# Patient Record
Sex: FEMALE | Race: WHITE | NOT HISPANIC OR LATINO | URBAN - METROPOLITAN AREA
[De-identification: names, ages, dates, MRNs, and addresses within clinical notes are randomized per-mention and may not be internally consistent; named-entity substitution may affect disease eponyms.]

---

## 2019-03-27 ENCOUNTER — OUTPATIENT (OUTPATIENT)
Dept: OUTPATIENT SERVICES | Facility: HOSPITAL | Age: 69
LOS: 1 days | Discharge: HOME | End: 2019-03-27

## 2019-03-27 VITALS — HEIGHT: 62 IN | WEIGHT: 119.49 LBS

## 2019-03-27 DIAGNOSIS — Z01.818 ENCOUNTER FOR OTHER PREPROCEDURAL EXAMINATION: ICD-10-CM

## 2019-03-27 DIAGNOSIS — G56.01 CARPAL TUNNEL SYNDROME, RIGHT UPPER LIMB: ICD-10-CM

## 2019-03-27 DIAGNOSIS — Z98.890 OTHER SPECIFIED POSTPROCEDURAL STATES: Chronic | ICD-10-CM

## 2019-03-27 LAB
ALBUMIN SERPL ELPH-MCNC: 4.5 G/DL — SIGNIFICANT CHANGE UP (ref 3.5–5.2)
ALP SERPL-CCNC: 76 U/L — SIGNIFICANT CHANGE UP (ref 30–115)
ALT FLD-CCNC: 31 U/L — SIGNIFICANT CHANGE UP (ref 0–41)
ANION GAP SERPL CALC-SCNC: 14 MMOL/L — SIGNIFICANT CHANGE UP (ref 7–14)
APTT BLD: 28.8 SEC — SIGNIFICANT CHANGE UP (ref 27–39.2)
AST SERPL-CCNC: 33 U/L — SIGNIFICANT CHANGE UP (ref 0–41)
BASOPHILS # BLD AUTO: 0.03 K/UL — SIGNIFICANT CHANGE UP (ref 0–0.2)
BASOPHILS NFR BLD AUTO: 0.4 % — SIGNIFICANT CHANGE UP (ref 0–1)
BILIRUB SERPL-MCNC: 0.3 MG/DL — SIGNIFICANT CHANGE UP (ref 0.2–1.2)
BUN SERPL-MCNC: 27 MG/DL — HIGH (ref 10–20)
CALCIUM SERPL-MCNC: 10.9 MG/DL — HIGH (ref 8.5–10.1)
CHLORIDE SERPL-SCNC: 98 MMOL/L — SIGNIFICANT CHANGE UP (ref 98–110)
CO2 SERPL-SCNC: 32 MMOL/L — SIGNIFICANT CHANGE UP (ref 17–32)
CREAT SERPL-MCNC: 0.7 MG/DL — SIGNIFICANT CHANGE UP (ref 0.7–1.5)
EOSINOPHIL # BLD AUTO: 0.18 K/UL — SIGNIFICANT CHANGE UP (ref 0–0.7)
EOSINOPHIL NFR BLD AUTO: 2.7 % — SIGNIFICANT CHANGE UP (ref 0–8)
GLUCOSE SERPL-MCNC: 85 MG/DL — SIGNIFICANT CHANGE UP (ref 70–99)
HCT VFR BLD CALC: 39.1 % — SIGNIFICANT CHANGE UP (ref 37–47)
HGB BLD-MCNC: 13 G/DL — SIGNIFICANT CHANGE UP (ref 12–16)
IMM GRANULOCYTES NFR BLD AUTO: 0.3 % — SIGNIFICANT CHANGE UP (ref 0.1–0.3)
INR BLD: 0.97 RATIO — SIGNIFICANT CHANGE UP (ref 0.65–1.3)
LYMPHOCYTES # BLD AUTO: 1.72 K/UL — SIGNIFICANT CHANGE UP (ref 1.2–3.4)
LYMPHOCYTES # BLD AUTO: 25.5 % — SIGNIFICANT CHANGE UP (ref 20.5–51.1)
MCHC RBC-ENTMCNC: 30.4 PG — SIGNIFICANT CHANGE UP (ref 27–31)
MCHC RBC-ENTMCNC: 33.2 G/DL — SIGNIFICANT CHANGE UP (ref 32–37)
MCV RBC AUTO: 91.6 FL — SIGNIFICANT CHANGE UP (ref 81–99)
MONOCYTES # BLD AUTO: 0.52 K/UL — SIGNIFICANT CHANGE UP (ref 0.1–0.6)
MONOCYTES NFR BLD AUTO: 7.7 % — SIGNIFICANT CHANGE UP (ref 1.7–9.3)
NEUTROPHILS # BLD AUTO: 4.27 K/UL — SIGNIFICANT CHANGE UP (ref 1.4–6.5)
NEUTROPHILS NFR BLD AUTO: 63.4 % — SIGNIFICANT CHANGE UP (ref 42.2–75.2)
NRBC # BLD: 0 /100 WBCS — SIGNIFICANT CHANGE UP (ref 0–0)
PLATELET # BLD AUTO: 238 K/UL — SIGNIFICANT CHANGE UP (ref 130–400)
POTASSIUM SERPL-MCNC: 4.5 MMOL/L — SIGNIFICANT CHANGE UP (ref 3.5–5)
POTASSIUM SERPL-SCNC: 4.5 MMOL/L — SIGNIFICANT CHANGE UP (ref 3.5–5)
PROT SERPL-MCNC: 7.1 G/DL — SIGNIFICANT CHANGE UP (ref 6–8)
PROTHROM AB SERPL-ACNC: 11.2 SEC — SIGNIFICANT CHANGE UP (ref 9.95–12.87)
RBC # BLD: 4.27 M/UL — SIGNIFICANT CHANGE UP (ref 4.2–5.4)
RBC # FLD: 13.1 % — SIGNIFICANT CHANGE UP (ref 11.5–14.5)
SODIUM SERPL-SCNC: 144 MMOL/L — SIGNIFICANT CHANGE UP (ref 135–146)
WBC # BLD: 6.74 K/UL — SIGNIFICANT CHANGE UP (ref 4.8–10.8)
WBC # FLD AUTO: 6.74 K/UL — SIGNIFICANT CHANGE UP (ref 4.8–10.8)

## 2019-03-27 NOTE — H&P PST ADULT - NSICDXPASTMEDICALHX_GEN_ALL_CORE_FT
PAST MEDICAL HISTORY:  Autonomous thyroid nodule     HTN (hypertension)     Lung nodule     OA (osteoarthritis)

## 2019-04-10 ENCOUNTER — OUTPATIENT (OUTPATIENT)
Dept: OUTPATIENT SERVICES | Facility: HOSPITAL | Age: 69
LOS: 1 days | Discharge: HOME | End: 2019-04-10

## 2019-04-10 VITALS
DIASTOLIC BLOOD PRESSURE: 69 MMHG | HEART RATE: 70 BPM | SYSTOLIC BLOOD PRESSURE: 154 MMHG | WEIGHT: 119.49 LBS | HEIGHT: 62 IN | OXYGEN SATURATION: 99 % | TEMPERATURE: 98 F | RESPIRATION RATE: 16 BRPM

## 2019-04-10 VITALS
HEART RATE: 56 BPM | RESPIRATION RATE: 18 BRPM | OXYGEN SATURATION: 96 % | SYSTOLIC BLOOD PRESSURE: 119 MMHG | DIASTOLIC BLOOD PRESSURE: 57 MMHG

## 2019-04-10 DIAGNOSIS — Z98.890 OTHER SPECIFIED POSTPROCEDURAL STATES: Chronic | ICD-10-CM

## 2019-04-10 DIAGNOSIS — I10 ESSENTIAL (PRIMARY) HYPERTENSION: ICD-10-CM

## 2019-04-10 RX ORDER — IBUPROFEN 200 MG
1 TABLET ORAL
Qty: 20 | Refills: 0
Start: 2019-04-10

## 2019-04-10 RX ORDER — HYDROMORPHONE HYDROCHLORIDE 2 MG/ML
0.5 INJECTION INTRAMUSCULAR; INTRAVENOUS; SUBCUTANEOUS ONCE
Qty: 0 | Refills: 0 | Status: DISCONTINUED | OUTPATIENT
Start: 2019-04-10 | End: 2019-04-10

## 2019-04-10 RX ORDER — SODIUM CHLORIDE 9 MG/ML
1000 INJECTION, SOLUTION INTRAVENOUS
Qty: 0 | Refills: 0 | Status: DISCONTINUED | OUTPATIENT
Start: 2019-04-10 | End: 2019-04-25

## 2019-04-10 RX ORDER — OXYCODONE AND ACETAMINOPHEN 5; 325 MG/1; MG/1
1 TABLET ORAL ONCE
Qty: 0 | Refills: 0 | Status: DISCONTINUED | OUTPATIENT
Start: 2019-04-10 | End: 2019-04-10

## 2019-04-10 RX ORDER — ONDANSETRON 8 MG/1
4 TABLET, FILM COATED ORAL ONCE
Qty: 0 | Refills: 0 | Status: COMPLETED | OUTPATIENT
Start: 2019-04-10 | End: 2019-04-10

## 2019-04-10 RX ADMIN — SODIUM CHLORIDE 100 MILLILITER(S): 9 INJECTION, SOLUTION INTRAVENOUS at 13:55

## 2019-04-10 RX ADMIN — ONDANSETRON 4 MILLIGRAM(S): 8 TABLET, FILM COATED ORAL at 13:55

## 2019-04-10 RX ADMIN — SODIUM CHLORIDE 100 MILLILITER(S): 9 INJECTION, SOLUTION INTRAVENOUS at 12:24

## 2019-04-10 NOTE — PRE-ANESTHESIA EVALUATION ADULT - NSANTHRISKNONERD_GEN_ALL_CORE
No risk alerts present No. DORIS screening performed.  STOP BANG Legend: 0-2 = LOW Risk; 3-4 = INTERMEDIATE Risk; 5-8 = HIGH Risk

## 2019-04-10 NOTE — ASU DISCHARGE PLAN (ADULT/PEDIATRIC) - CARE PROVIDER_API CALL
Yovany Kitchen)  Orthopaedic Surgery  [address] [place] [address]  Phone: [phone]  Fax: [phone]  Follow Up Time:

## 2019-04-10 NOTE — PRE-ANESTHESIA EVALUATION ADULT - NSANTHOSAYNRD_GEN_A_CORE
SE TOOL/No. CORAL screening performed.  STOP BANG Legend: 0-2 = LOW Risk; 3-4 = INTERMEDIATE Risk; 5-8 = HIGH Risk

## 2019-04-10 NOTE — ASU DISCHARGE PLAN (ADULT/PEDIATRIC) - ASU DC SPECIAL INSTRUCTIONSFT

## 2019-04-10 NOTE — ASU PREOP CHECKLIST - AS BP NONINV SITE
right upper arm
The patient is a 51y Female complaining of intermittent left c/p rad to back since Friday with nausea, palpitations, "feeling like passing out." Denies symptoms at this time. PMHx hypothyroidism, diabetes.

## 2019-04-14 DIAGNOSIS — G56.01 CARPAL TUNNEL SYNDROME, RIGHT UPPER LIMB: ICD-10-CM

## 2019-12-30 PROBLEM — Z00.00 ENCOUNTER FOR PREVENTIVE HEALTH EXAMINATION: Status: ACTIVE | Noted: 2019-12-30

## 2020-07-16 PROBLEM — M19.90 UNSPECIFIED OSTEOARTHRITIS, UNSPECIFIED SITE: Chronic | Status: ACTIVE | Noted: 2019-03-27

## 2020-07-16 PROBLEM — E04.9 NONTOXIC GOITER, UNSPECIFIED: Chronic | Status: ACTIVE | Noted: 2019-03-27

## 2020-07-16 PROBLEM — R91.1 SOLITARY PULMONARY NODULE: Chronic | Status: ACTIVE | Noted: 2019-03-27

## 2020-07-16 PROBLEM — I10 ESSENTIAL (PRIMARY) HYPERTENSION: Chronic | Status: ACTIVE | Noted: 2019-03-27

## 2020-07-26 ENCOUNTER — OUTPATIENT (OUTPATIENT)
Dept: OUTPATIENT SERVICES | Facility: HOSPITAL | Age: 70
LOS: 1 days | Discharge: HOME | End: 2020-07-26

## 2020-07-26 DIAGNOSIS — Z98.890 OTHER SPECIFIED POSTPROCEDURAL STATES: Chronic | ICD-10-CM

## 2020-07-26 DIAGNOSIS — Z11.59 ENCOUNTER FOR SCREENING FOR OTHER VIRAL DISEASES: ICD-10-CM

## 2020-07-29 ENCOUNTER — OUTPATIENT (OUTPATIENT)
Dept: OUTPATIENT SERVICES | Facility: HOSPITAL | Age: 70
LOS: 1 days | Discharge: HOME | End: 2020-07-29

## 2020-07-29 VITALS
HEIGHT: 62 IN | WEIGHT: 123.9 LBS | SYSTOLIC BLOOD PRESSURE: 166 MMHG | TEMPERATURE: 99 F | HEART RATE: 91 BPM | RESPIRATION RATE: 20 BRPM | OXYGEN SATURATION: 99 % | DIASTOLIC BLOOD PRESSURE: 72 MMHG

## 2020-07-29 VITALS
DIASTOLIC BLOOD PRESSURE: 72 MMHG | RESPIRATION RATE: 20 BRPM | SYSTOLIC BLOOD PRESSURE: 157 MMHG | HEART RATE: 78 BPM | OXYGEN SATURATION: 98 %

## 2020-07-29 DIAGNOSIS — Z98.890 OTHER SPECIFIED POSTPROCEDURAL STATES: Chronic | ICD-10-CM

## 2020-07-29 RX ORDER — METOPROLOL TARTRATE 50 MG
1 TABLET ORAL
Qty: 0 | Refills: 0 | DISCHARGE

## 2020-07-29 NOTE — ASU DISCHARGE PLAN (ADULT/PEDIATRIC) - ASU DC SPECIAL INSTRUCTIONSFT
DIET:    Resume normal diet  No alcoholic  beverages for 24 hours or if on prescribed narcotic pain medications.    MEDICATION:    Resume your preoperative oral medications.  Check with your physician before starting aspirin, Coumadin, or other blood thinners.  Prescriptions given to you - take as directed.      ACTIVITY:    Rest today and limit your activities for 24 hours.  Do not drive or operate machinery for 24 hours - if you received anesthesia.  When taking pain medication, be careful as you walk or climb stairs.  It is not advisable to drive while taking prescribed pain medication.    SPECIAL INSTRUCTIONS:    _x____ Elevate operative site above heart level or as directed.  __x___ Apply ice to operative site as directed.  _____ Use  sling as directed.  __x___ Exercise fingers.    DRESSING CARE:    ___x__ You may change the dressing 4 days. Keep wound covered with band-aids.  _____ Do not change the dressing until your doctor see you.  _____ You can loosen or rewrap the dressing.  _____  Keep dressing clean and dry.  _____ You may shower in _____ day(s) with the extremity covered by a plastic bag.  ___x__ OK to wash hand , including showers, in ____4_ day(s).    ADDITIONAL  INFORMATION:    Post operative visit should be scheduled for next week.  If you are not aware of visit please contact office.  If you have any questions or concerns call office at      Notify your doctor if you develop   Fever  Excessive Swelling  Chills   Drainage   Pain not controlled by medication  Persistent numbness in hand or fingers    If an Emergency arises call 911 and/or go to the Emergency Room

## 2020-07-31 DIAGNOSIS — G56.02 CARPAL TUNNEL SYNDROME, LEFT UPPER LIMB: ICD-10-CM

## 2021-02-10 ENCOUNTER — APPOINTMENT (OUTPATIENT)
Dept: OBGYN | Facility: CLINIC | Age: 71
End: 2021-02-10
Payer: MEDICARE

## 2021-02-10 VITALS
WEIGHT: 118 LBS | SYSTOLIC BLOOD PRESSURE: 131 MMHG | HEART RATE: 87 BPM | TEMPERATURE: 97.8 F | DIASTOLIC BLOOD PRESSURE: 85 MMHG | BODY MASS INDEX: 21.71 KG/M2 | HEIGHT: 62 IN

## 2021-02-10 DIAGNOSIS — Z87.39 PERSONAL HISTORY OF OTHER DISEASES OF THE MUSCULOSKELETAL SYSTEM AND CONNECTIVE TISSUE: ICD-10-CM

## 2021-02-10 DIAGNOSIS — Z86.79 PERSONAL HISTORY OF OTHER DISEASES OF THE CIRCULATORY SYSTEM: ICD-10-CM

## 2021-02-10 DIAGNOSIS — Z86.018 PERSONAL HISTORY OF OTHER BENIGN NEOPLASM: ICD-10-CM

## 2021-02-10 DIAGNOSIS — Z78.9 OTHER SPECIFIED HEALTH STATUS: ICD-10-CM

## 2021-02-10 PROCEDURE — G0101: CPT

## 2021-02-10 NOTE — HISTORY OF PRESENT ILLNESS
[Patient reported mammogram was normal] : Patient reported mammogram was normal [Patient reported PAP Smear was normal] : Patient reported PAP Smear was normal [Patient reported bone density results were abnormal] : Patient reported bone density results were abnormal [Patient reported colonoscopy was normal] : Patient reported colonoscopy was normal [postmenopausal] : postmenopausal [N] : Patient is not sexually active [Y] : Positive pregnancy history [Menarche Age: ____] : age at menarche was [unfilled] [Post-Menopause, No Sxs] : post-menopausal, currently without symptoms [Menopause Age: ____] : age at menopause was [unfilled] [Previously active] : previously active [Men] : men [No] : No [Patient refuses STI testing] : Patient refuses STI testing [FreeTextEntry1] : Pt is here for an annual exam. Overall pt feels good , denies any pelvic pain or postmenopausal bleeding. reports history of uterine fibroids, and wants to check that it has gone away.  [Mammogramdate] : 2019 [PapSmeardate] : 2018 [BoneDensityDate] : 2019 [TextBox_37] : Osteopenia : advised Calcium and Vit D [ColonoscopyDate] : 2017 [PGHxTotal] : 2 [[facility]] : 2

## 2021-02-10 NOTE — DISCUSSION/SUMMARY
[FreeTextEntry1] : Ordered: screening mammogram, TVUS.\par Discussed pap screening guidelines; based on long history of all normal paps (including several in the past decade), age over 70, and no sexual activity, patient declines pap smear today.\par Declines bloodwork, does with PCP. \par RTC for results, as needed, and annual gyn exam.

## 2021-02-10 NOTE — REVIEW OF SYSTEMS
[Bloating] : bloating [Joint Stiffness] : joint stiffness [Negative] : Heme/Lymph [FreeTextEntry7] : patient has GI doctor visit, endoscopy, and CT of abdomen/pelvis scheduled for this month [FreeTextEntry9] : follows with orthopedist

## 2021-02-10 NOTE — COUNSELING
[Nutrition/ Exercise/ Weight Management] : nutrition, exercise, weight management [Body Image] : body image [Vitamins/Supplements] : vitamins/supplements [Sunscreen] : sunscreen [Breast Self Exam] : breast self exam [Bladder Hygiene] : bladder hygiene [Confidentiality] : confidentiality [Vaccines] : vaccines [STD (testing, results, tx)] : STD (testing, results, tx) [Influenza Vaccine] : influenza vaccine [Medication Management] : medication management [FreeTextEntry2] : f/u with GI doctor

## 2021-02-10 NOTE — PHYSICAL EXAM
[Appropriately responsive] : appropriately responsive [Alert] : alert [No Acute Distress] : no acute distress [No Lymphadenopathy] : no lymphadenopathy [Soft] : soft [Non-tender] : non-tender [Non-distended] : non-distended [No HSM] : No HSM [No Lesions] : no lesions [No Mass] : no mass [Oriented x3] : oriented x3 [Examination Of The Breasts] : a normal appearance [No Masses] : no breast masses were palpable [Labia Majora] : normal [Labia Minora] : normal [Atrophy] : atrophy [Dry Mucosa] : dry mucosa [Normal] : normal [Uterine Adnexae] : non-palpable [Declined] : Patient declined rectal exam [FreeTextEntry4] : postmenopausal atrophic appearance

## 2021-03-03 DIAGNOSIS — R92.2 INCONCLUSIVE MAMMOGRAM: ICD-10-CM

## 2021-05-12 ENCOUNTER — APPOINTMENT (OUTPATIENT)
Dept: PULMONOLOGY | Facility: CLINIC | Age: 71
End: 2021-05-12
Payer: MEDICARE

## 2021-05-12 VITALS
SYSTOLIC BLOOD PRESSURE: 130 MMHG | HEIGHT: 62 IN | HEART RATE: 87 BPM | WEIGHT: 121 LBS | BODY MASS INDEX: 22.26 KG/M2 | OXYGEN SATURATION: 98 % | RESPIRATION RATE: 14 BRPM | DIASTOLIC BLOOD PRESSURE: 76 MMHG

## 2021-05-12 PROCEDURE — 99213 OFFICE O/P EST LOW 20 MIN: CPT

## 2021-05-12 NOTE — HISTORY OF PRESENT ILLNESS
[TextBox_4] : SCOLIOSIS UNDERGOING PT/ SOB/ TIRED IN PM, NO COUGH, FEVER, CHEST TIGHNTESS, WHEEZING, NON SMOKER, WAS SEEN 2 YE AGO, CHEST CT 5/19 REVIEWED AND DISCUSS RESULT

## 2021-05-12 NOTE — PHYSICAL EXAM
[No Acute Distress] : no acute distress [Normal Oropharynx] : normal oropharynx [Normal Appearance] : normal appearance [No Neck Mass] : no neck mass [Normal Rate/Rhythm] : normal rate/rhythm [Normal S1, S2] : normal s1, s2 [No Murmurs] : no murmurs [No Resp Distress] : no resp distress [Clear to Auscultation Bilaterally] : clear to auscultation bilaterally [Benign] : benign [Normal Gait] : normal gait [No Clubbing] : no clubbing [No Cyanosis] : no cyanosis [No Edema] : no edema [FROM] : FROM [Normal Color/ Pigmentation] : normal color/ pigmentation [No Focal Deficits] : no focal deficits [Oriented x3] : oriented x3 [Normal Affect] : normal affect [TextBox_80] : SCOLIOSIS

## 2021-06-16 ENCOUNTER — APPOINTMENT (OUTPATIENT)
Age: 71
End: 2021-06-16
Payer: MEDICARE

## 2021-06-16 PROCEDURE — 94010 BREATHING CAPACITY TEST: CPT

## 2021-06-16 PROCEDURE — 94729 DIFFUSING CAPACITY: CPT

## 2021-06-16 PROCEDURE — 94727 GAS DIL/WSHOT DETER LNG VOL: CPT

## 2021-08-06 ENCOUNTER — TRANSCRIPTION ENCOUNTER (OUTPATIENT)
Age: 71
End: 2021-08-06

## 2022-04-12 NOTE — ASU PATIENT PROFILE, ADULT - PAIN SCALE PREFERRED, PROFILE
none
Angioedema      Angioedema is swelling in the body. The swelling can occur in any part of the body. It often happens on the skin. It may cause itchy, bumpy patches (hives) to form. This condition may:  •Occur only one time.      •Happen more than one time. It can also stop at any time.      •Keep coming back for a number of years. Someday it may stop.        What are the causes?    This condition may be caused by:  •Foods, such as milk, eggs, shellfish, wheat, or nuts.      •Medicines, such as ACE inhibitors, antibiotics, NSAIDs, birth control pills, or dyes used in X-rays.      Hereditary angioedema (HAE) is passed from parent to child. Symptoms can occur because of:  •Illness, infection, or stress.      •Changes in hormones.       •Exercise.      •Minor surgery.       •Dental work.      In some cases, the cause of this condition may not be known.      What increases the risk?    You are more likely to have HAE if you have family members with this condition.      What are the signs or symptoms?     Symptoms of this condition include:  •Swollen skin.      •Red, itchy patches of skin.      •Pain, pressure, or tenderness in the affected area.      •Swollen eyelids, face, lips, or tongue.      •Wheezing.      •Trouble drinking, swallowing, or closing the mouth completely.      •Being hoarse or having a sore throat.      •Problems breathing.      If your organs are affected:  •You may feel like vomiting.      •You may have pain in your belly.      •You may vomit or have watery poop (diarrhea).      •You may have trouble swallowing.      •You may have trouble peeing.        How is this treated?    To treat this condition, you may be told:  •To avoid things that cause attacks (triggers). These include foods or things that cause allergies.      •To stop medicines that cause the condition.      •To take medicines to treat the condition.      In very bad cases, a breathing tube or a machine that helps with breathing (ventilator) may be used.      Follow these instructions at home:     •Take all medicines only as told by your doctor.      •If you were given medicines to treat allergies, always carry them with you.      •Wear a medical bracelet as told by your doctor.    •Avoid the things that cause attacks. These may include:  •Foods.      •Things in your environment (such as pollen).      •Stress.      •Exercise.        •Avoid all medicines that caused the attacks.      •Talk to your doctor before you have kids. Some types of this condition may be passed from parent to child.        Where to find more information    •[employer]: www.aaaai.org        Contact a doctor if:    •You have another attack.      •Your attacks happen more often, even after you take steps to prevent them.      •Your attacks are worse every time they occur.      •This condition was passed to you by your parents and you want to have kids.        Get help right away if:    •Your mouth, tongue, or lips get very swollen.      •Your swelling becomes worse.      •You have trouble breathing.      •You have trouble swallowing.      •You have trouble talking.      •You have chest pain or you feel dizzy.      •You faint.      These symptoms may be an emergency. Do not wait to see if the symptoms will go away. Get help right away. Call your local emergency services (911 in the [place] Do not drive yourself to the hospital.       Summary    •Angioedema is swelling that can happen in any part of the body.      •It can be caused by the food you eat or the medicines you are taking. It can also be passed from parent to child.      •Avoid the things that cause your attacks. These can be food, medicines, or things in your environment.      •If you were given medicines for allergies, always carry them with you.      •Get help right away if your mouth, tongue, or lips get swollen. Also, get help right away if you have trouble breathing or swallowing.      This information is not intended to replace advice given to you by your health care provider. Make sure you discuss any questions you have with your health care provider.

## 2022-05-04 ENCOUNTER — APPOINTMENT (OUTPATIENT)
Age: 72
End: 2022-05-04
Payer: MEDICARE

## 2022-05-04 VITALS
HEART RATE: 77 BPM | RESPIRATION RATE: 14 BRPM | SYSTOLIC BLOOD PRESSURE: 136 MMHG | BODY MASS INDEX: 22.45 KG/M2 | OXYGEN SATURATION: 97 % | HEIGHT: 62 IN | DIASTOLIC BLOOD PRESSURE: 76 MMHG | WEIGHT: 122 LBS

## 2022-05-04 PROCEDURE — 99213 OFFICE O/P EST LOW 20 MIN: CPT

## 2022-05-04 NOTE — DISCUSSION/SUMMARY
[FreeTextEntry1] : SOB ON EXERTION/ SCOLIOSIS/ EXTRATHORACIC RESTRICTION/ PFT NL\par LUNG NODULE STABLE\par PRIOR RECORD REVIEWED\par

## 2022-11-08 ENCOUNTER — APPOINTMENT (OUTPATIENT)
Dept: CARDIOLOGY | Facility: CLINIC | Age: 72
End: 2022-11-08

## 2022-11-08 VITALS
DIASTOLIC BLOOD PRESSURE: 70 MMHG | WEIGHT: 121 LBS | HEART RATE: 74 BPM | SYSTOLIC BLOOD PRESSURE: 124 MMHG | OXYGEN SATURATION: 97 % | BODY MASS INDEX: 22.26 KG/M2 | HEIGHT: 62 IN

## 2022-11-08 PROCEDURE — 93000 ELECTROCARDIOGRAM COMPLETE: CPT

## 2022-11-08 PROCEDURE — 99204 OFFICE O/P NEW MOD 45 MIN: CPT

## 2022-11-09 NOTE — HISTORY OF PRESENT ILLNESS
[FreeTextEntry1] : JODY EMERSON is a 72-year-old female, with a PMHx significant for HTN and HLD, who presents today for cardiac evaluation. She was followed by Dr. Khan until senior living. Denies any chest pain or SOB. Indicates she is active daily. Prior echocardiogram revealed normal ejection fraction, diastolic dysfunction, and mild TR. Prior [facility]OT Telemetry monitor revealed 16% PAC burden, which was after COVID vaccine administration.

## 2022-11-09 NOTE — DISCUSSION/SUMMARY
[EKG obtained to assist in diagnosis and management of assessed problem(s)] : EKG obtained to assist in diagnosis and management of assessed problem(s) [FreeTextEntry1] : PACs: Currently, the condition is stable. No evidence of PACs. EKG performed today was unremarkable. Echocardiogram revealed normal EF and diastolic dysfunction. \par \par HTN: The impression is hypertension. Currently, the condition is stable. There are no changes in medication management. Continue current medical therapy. Other planned treatments include an exercise regimen, weight loss, low sodium diet, and alcohol moderation.\par \par HLD: The impression is hyperlipidemia. Currently, the condition is stable. There are no changes in medication management. Continue current medical therapy. Other planned treatment includes diet modification, exercise, and weight loss.\par \par Instructed to follow up in 6 months. \par \par Plan was discussed with the patient.\par

## 2022-11-16 ENCOUNTER — EMERGENCY (EMERGENCY)
Facility: HOSPITAL | Age: 72
LOS: 0 days | Discharge: HOME | End: 2022-11-16
Admitting: EMERGENCY MEDICINE

## 2022-11-16 ENCOUNTER — INPATIENT (INPATIENT)
Facility: HOSPITAL | Age: 72
LOS: 0 days | Discharge: HOME | End: 2022-11-17
Attending: INTERNAL MEDICINE | Admitting: INTERNAL MEDICINE

## 2022-11-16 VITALS
WEIGHT: 119.93 LBS | OXYGEN SATURATION: 96 % | DIASTOLIC BLOOD PRESSURE: 86 MMHG | HEIGHT: 62 IN | HEART RATE: 128 BPM | SYSTOLIC BLOOD PRESSURE: 163 MMHG | RESPIRATION RATE: 18 BRPM | TEMPERATURE: 98 F

## 2022-11-16 DIAGNOSIS — M19.90 UNSPECIFIED OSTEOARTHRITIS, UNSPECIFIED SITE: ICD-10-CM

## 2022-11-16 DIAGNOSIS — Z98.890 OTHER SPECIFIED POSTPROCEDURAL STATES: Chronic | ICD-10-CM

## 2022-11-16 DIAGNOSIS — R00.2 PALPITATIONS: ICD-10-CM

## 2022-11-16 DIAGNOSIS — G89.29 OTHER CHRONIC PAIN: ICD-10-CM

## 2022-11-16 DIAGNOSIS — I48.0 PAROXYSMAL ATRIAL FIBRILLATION: ICD-10-CM

## 2022-11-16 DIAGNOSIS — E78.00 PURE HYPERCHOLESTEROLEMIA, UNSPECIFIED: ICD-10-CM

## 2022-11-16 DIAGNOSIS — I10 ESSENTIAL (PRIMARY) HYPERTENSION: ICD-10-CM

## 2022-11-16 DIAGNOSIS — M41.9 SCOLIOSIS, UNSPECIFIED: ICD-10-CM

## 2022-11-16 DIAGNOSIS — Z20.822 CONTACT WITH AND (SUSPECTED) EXPOSURE TO COVID-19: ICD-10-CM

## 2022-11-16 DIAGNOSIS — Z86.39 PERSONAL HISTORY OF OTHER ENDOCRINE, NUTRITIONAL AND METABOLIC DISEASE: ICD-10-CM

## 2022-11-16 DIAGNOSIS — M54.50 LOW BACK PAIN, UNSPECIFIED: ICD-10-CM

## 2022-11-16 DIAGNOSIS — Z87.09 PERSONAL HISTORY OF OTHER DISEASES OF THE RESPIRATORY SYSTEM: ICD-10-CM

## 2022-11-16 LAB
ALBUMIN SERPL ELPH-MCNC: 4.6 G/DL — SIGNIFICANT CHANGE UP (ref 3.5–5.2)
ALP SERPL-CCNC: 72 U/L — SIGNIFICANT CHANGE UP (ref 30–115)
ALT FLD-CCNC: 27 U/L — SIGNIFICANT CHANGE UP (ref 0–41)
ANION GAP SERPL CALC-SCNC: 10 MMOL/L — SIGNIFICANT CHANGE UP (ref 7–14)
AST SERPL-CCNC: 31 U/L — SIGNIFICANT CHANGE UP (ref 0–41)
BASOPHILS # BLD AUTO: 0.03 K/UL — SIGNIFICANT CHANGE UP (ref 0–0.2)
BASOPHILS NFR BLD AUTO: 0.4 % — SIGNIFICANT CHANGE UP (ref 0–1)
BILIRUB SERPL-MCNC: 0.5 MG/DL — SIGNIFICANT CHANGE UP (ref 0.2–1.2)
BUN SERPL-MCNC: 23 MG/DL — HIGH (ref 10–20)
CALCIUM SERPL-MCNC: 9.9 MG/DL — SIGNIFICANT CHANGE UP (ref 8.4–10.5)
CHLORIDE SERPL-SCNC: 100 MMOL/L — SIGNIFICANT CHANGE UP (ref 98–110)
CO2 SERPL-SCNC: 32 MMOL/L — SIGNIFICANT CHANGE UP (ref 17–32)
CREAT SERPL-MCNC: 0.7 MG/DL — SIGNIFICANT CHANGE UP (ref 0.7–1.5)
EGFR: 92 ML/MIN/1.73M2 — SIGNIFICANT CHANGE UP
EOSINOPHIL # BLD AUTO: 0.06 K/UL — SIGNIFICANT CHANGE UP (ref 0–0.7)
EOSINOPHIL NFR BLD AUTO: 0.8 % — SIGNIFICANT CHANGE UP (ref 0–8)
GLUCOSE SERPL-MCNC: 97 MG/DL — SIGNIFICANT CHANGE UP (ref 70–99)
HCT VFR BLD CALC: 35.2 % — LOW (ref 37–47)
HGB BLD-MCNC: 11.9 G/DL — LOW (ref 12–16)
IMM GRANULOCYTES NFR BLD AUTO: 0.3 % — SIGNIFICANT CHANGE UP (ref 0.1–0.3)
LYMPHOCYTES # BLD AUTO: 1.05 K/UL — LOW (ref 1.2–3.4)
LYMPHOCYTES # BLD AUTO: 14.4 % — LOW (ref 20.5–51.1)
MAGNESIUM SERPL-MCNC: 2 MG/DL — SIGNIFICANT CHANGE UP (ref 1.8–2.4)
MCHC RBC-ENTMCNC: 31.3 PG — HIGH (ref 27–31)
MCHC RBC-ENTMCNC: 33.8 G/DL — SIGNIFICANT CHANGE UP (ref 32–37)
MCV RBC AUTO: 92.6 FL — SIGNIFICANT CHANGE UP (ref 81–99)
MONOCYTES # BLD AUTO: 0.45 K/UL — SIGNIFICANT CHANGE UP (ref 0.1–0.6)
MONOCYTES NFR BLD AUTO: 6.2 % — SIGNIFICANT CHANGE UP (ref 1.7–9.3)
NEUTROPHILS # BLD AUTO: 5.69 K/UL — SIGNIFICANT CHANGE UP (ref 1.4–6.5)
NEUTROPHILS NFR BLD AUTO: 77.9 % — HIGH (ref 42.2–75.2)
NRBC # BLD: 0 /100 WBCS — SIGNIFICANT CHANGE UP (ref 0–0)
PLATELET # BLD AUTO: 245 K/UL — SIGNIFICANT CHANGE UP (ref 130–400)
POTASSIUM SERPL-MCNC: 3.7 MMOL/L — SIGNIFICANT CHANGE UP (ref 3.5–5)
POTASSIUM SERPL-SCNC: 3.7 MMOL/L — SIGNIFICANT CHANGE UP (ref 3.5–5)
PROT SERPL-MCNC: 6.9 G/DL — SIGNIFICANT CHANGE UP (ref 6–8)
RBC # BLD: 3.8 M/UL — LOW (ref 4.2–5.4)
RBC # FLD: 13.3 % — SIGNIFICANT CHANGE UP (ref 11.5–14.5)
SARS-COV-2 RNA SPEC QL NAA+PROBE: SIGNIFICANT CHANGE UP
SODIUM SERPL-SCNC: 142 MMOL/L — SIGNIFICANT CHANGE UP (ref 135–146)
TROPONIN T SERPL-MCNC: 0.01 NG/ML — SIGNIFICANT CHANGE UP
WBC # BLD: 7.3 K/UL — SIGNIFICANT CHANGE UP (ref 4.8–10.8)
WBC # FLD AUTO: 7.3 K/UL — SIGNIFICANT CHANGE UP (ref 4.8–10.8)

## 2022-11-16 PROCEDURE — 99221 1ST HOSP IP/OBS SF/LOW 40: CPT | Mod: GC

## 2022-11-16 PROCEDURE — 93010 ELECTROCARDIOGRAM REPORT: CPT

## 2022-11-16 PROCEDURE — 71045 X-RAY EXAM CHEST 1 VIEW: CPT | Mod: 26

## 2022-11-16 PROCEDURE — 99285 EMERGENCY DEPT VISIT HI MDM: CPT | Mod: FS

## 2022-11-16 RX ORDER — ATORVASTATIN CALCIUM 80 MG/1
20 TABLET, FILM COATED ORAL AT BEDTIME
Refills: 0 | Status: DISCONTINUED | OUTPATIENT
Start: 2022-11-16 | End: 2022-11-17

## 2022-11-16 RX ORDER — ENOXAPARIN SODIUM 100 MG/ML
40 INJECTION SUBCUTANEOUS EVERY 24 HOURS
Refills: 0 | Status: DISCONTINUED | OUTPATIENT
Start: 2022-11-16 | End: 2022-11-17

## 2022-11-16 RX ORDER — OMEGA-3 ACID ETHYL ESTERS 1 G
1 CAPSULE ORAL
Qty: 0 | Refills: 0 | DISCHARGE

## 2022-11-16 RX ORDER — ASCORBIC ACID 60 MG
1 TABLET,CHEWABLE ORAL
Qty: 0 | Refills: 0 | DISCHARGE

## 2022-11-16 RX ORDER — SIMVASTATIN 20 MG/1
1 TABLET, FILM COATED ORAL
Qty: 0 | Refills: 0 | DISCHARGE

## 2022-11-16 RX ORDER — CALCIUM CARBONATE 500(1250)
2 TABLET ORAL
Qty: 0 | Refills: 0 | DISCHARGE

## 2022-11-16 RX ORDER — ZINC SULFATE TAB 220 MG (50 MG ZINC EQUIVALENT) 220 (50 ZN) MG
0 TAB ORAL
Qty: 0 | Refills: 0 | DISCHARGE

## 2022-11-16 RX ORDER — LOSARTAN POTASSIUM 100 MG/1
25 TABLET, FILM COATED ORAL DAILY
Refills: 0 | Status: DISCONTINUED | OUTPATIENT
Start: 2022-11-16 | End: 2022-11-17

## 2022-11-16 RX ORDER — METOPROLOL TARTRATE 50 MG
12.5 TABLET ORAL EVERY 12 HOURS
Refills: 0 | Status: DISCONTINUED | OUTPATIENT
Start: 2022-11-16 | End: 2022-11-17

## 2022-11-16 RX ORDER — MULTIVIT-MIN/FERROUS GLUCONATE 9 MG/15 ML
1 LIQUID (ML) ORAL
Qty: 0 | Refills: 0 | DISCHARGE

## 2022-11-16 RX ORDER — CHOLECALCIFEROL (VITAMIN D3) 125 MCG
1 CAPSULE ORAL
Qty: 0 | Refills: 0 | DISCHARGE

## 2022-11-16 RX ORDER — HYDROCHLOROTHIAZIDE 25 MG
50 TABLET ORAL DAILY
Refills: 0 | Status: DISCONTINUED | OUTPATIENT
Start: 2022-11-16 | End: 2022-11-17

## 2022-11-16 NOTE — ED PROVIDER NOTE - EKG #1 DATE/TIME
16-Nov-2022 14:04 Melolabial Transposition Flap Text: The defect edges were debeveled with a #15 scalpel blade.  Given the location of the defect and the proximity to free margins a melolabial flap was deemed most appropriate.  Using a sterile surgical marker, an appropriate melolabial transposition flap was drawn incorporating the defect.    The area thus outlined was incised deep to adipose tissue with a #15 scalpel blade.  The skin margins were undermined to an appropriate distance in all directions utilizing iris scissors.

## 2022-11-16 NOTE — H&P ADULT - ATTENDING COMMENTS
HPI:  72-year-old female with history of hypertension, high cholesterol, arthritis and scoliosis presents to the ED complaining of palpitations heart racing while prepping to have back injection at a pain management doctor's office. At baseline, patient uses a cane for ambulation.    Patient was in her usual state of health today, she went to her pain management doctor's office for back injection for scoliosis. At the office patient was very nervous and is also under a lot of stress because of personal problems. Before the procedure begun, patient started having palpitations, at the office, patient was found to be in afib with HR 130s as per patient. Denies HA, dizziness, NVD, fever, SOB, chest pain, abdominal pain, change in urination and change in bowel habits.     Of note, patient endorses having a 2 week tele monitoring, 2 years ago, which showed some kind of irregular rhythm, possibly afib.    In the ED, CBC, CMP wnl, EKG shows NSR.    Vital Signs Last 24 Hrs:  T(F): 98.2 (16 Nov 2022 17:03), Max: 98.2 (16 Nov 2022 15:41)  HR: 75 (16 Nov 2022 17:03) (73 - 128)  BP: 120/58 (16 Nov 2022 17:03) (120/58 - 163/86)  RR: 19 (16 Nov 2022 17:03) (18 - 19)  SpO2: 98% (16 Nov 2022 17:03) (96% - 99%) on RA   (16 Nov 2022 19:41)    REVIEW OF SYSTEMS: see cc/HPI   CONSTITUTIONAL: No weakness, fevers or chills  EYES/ENT: No visual changes;  No vertigo or throat pain   NECK: No pain or stiffness  RESPIRATORY: No cough, wheezing, hemoptysis; No shortness of breath  CARDIOVASCULAR: No chest pain or palpitations  GASTROINTESTINAL: No abdominal or epigastric pain. No nausea, vomiting, or hematemesis; No diarrhea or constipation. No melena or hematochezia.  GENITOURINARY: No dysuria, frequency or hematuria  NEUROLOGICAL: No numbness or weakness  SKIN: No itching, rashes    Physical Exam:   General: WN/WD NAD  Neurology: A&Ox3, nonfocal, follows commands  Eyes: PERRLA/ EOMI  ENT/Neck: Neck supple, trachea midline, No JVD  Respiratory: CTA B/L, No wheezing, rales, rhonchi  CV: Normal rate regular rhythm, S1S2, no murmurs, rubs or gallops  Abdominal: Soft, NT, ND +BS,   Extremities: No edema, + peripheral pulses  Skin: No Rashes, Hematoma, Ecchymosis  Incisions:   Tubes:    A/p HPI:  72-year-old female with history of hypertension, high cholesterol, arthritis and scoliosis presents to the ED complaining of palpitations heart racing while prepping to have back injection at a pain management doctor's office. At baseline, patient uses a cane for ambulation.    Patient was in her usual state of health today, she went to her pain management doctor's office for back injection for scoliosis. At the office patient was very nervous and is also under a lot of stress because of personal problems. Before the procedure begun, patient started having palpitations, at the office, patient was found to be in afib with HR 130s as per patient. Denies HA, dizziness, NVD, fever, SOB, chest pain, abdominal pain, change in urination and change in bowel habits.     Of note, patient endorses having a 2 week tele monitoring, 2 years ago, which showed some kind of irregular rhythm, possibly afib.    In the ED, CBC, CMP wnl, EKG shows NSR.    Vital Signs Last 24 Hrs:  T(F): 98.2 (16 Nov 2022 17:03), Max: 98.2 (16 Nov 2022 15:41)  HR: 75 (16 Nov 2022 17:03) (73 - 128)  BP: 120/58 (16 Nov 2022 17:03) (120/58 - 163/86)  RR: 19 (16 Nov 2022 17:03) (18 - 19)  SpO2: 98% (16 Nov 2022 17:03) (96% - 99%) on RA   (16 Nov 2022 19:41)    REVIEW OF SYSTEMS: see cc/HPI   CONSTITUTIONAL: No weakness, fevers or chills  EYES/ENT: No visual changes;  No vertigo or throat pain   NECK: No pain or stiffness  RESPIRATORY: No cough, wheezing, hemoptysis; No shortness of breath  CARDIOVASCULAR: No chest pain (+) palpitations, see cc/HPI   GASTROINTESTINAL: No abdominal or epigastric pain. No nausea, vomiting, or hematemesis; No diarrhea or constipation. No melena or hematochezia.  GENITOURINARY: No dysuria, frequency or hematuria  NEUROLOGICAL: No numbness or weakness  SKIN: No itching, rashes    Physical Exam:   General: WN/WD NAD  Neurology: A&Ox3, nonfocal, follows commands  Eyes: PERRLA/ EOMI  ENT/Neck: Neck supple, trachea midline, No JVD  Respiratory: CTA B/L, No wheezing, rales, rhonchi  CV: Normal rate regular rhythm, S1S2, no murmurs, rubs or gallops  Abdominal: Soft, NT, ND +BS,   Extremities: No edema, + peripheral pulses  Skin: No Rashes, Hematoma, Ecchymosis  Incisions:   Tubes:    A/p  Paroxysmal A. Fib w/ RVR (currently sinus and rate controlled on tele )  -Eliquis in the AM for DC  -Metoprolol for now   -check TSH   -EP for final D/c recommendations     Arthritis - stable   -PRN Tylenol     HTN -elevated in the ED  -c/w Losartan/ HCTZ/ Metoprolol     PATIENT SEEN by ATTENDING 11/16/22 ( note revised 11/17)

## 2022-11-16 NOTE — H&P ADULT - NSHPLABSRESULTS_GEN_ALL_CORE
CBC Full  -  ( 16 Nov 2022 15:27 )  WBC Count : 7.30 K/uL  RBC Count : 3.80 M/uL  Hemoglobin : 11.9 g/dL  Hematocrit : 35.2 %  Platelet Count - Automated : 245 K/uL  Mean Cell Volume : 92.6 fL  Mean Cell Hemoglobin : 31.3 pg  Mean Cell Hemoglobin Concentration : 33.8 g/dL  Auto Neutrophil # : 5.69 K/uL  Auto Lymphocyte # : 1.05 K/uL  Auto Monocyte # : 0.45 K/uL  Auto Eosinophil # : 0.06 K/uL  Auto Basophil # : 0.03 K/uL  Auto Neutrophil % : 77.9 %  Auto Lymphocyte % : 14.4 %  Auto Monocyte % : 6.2 %  Auto Eosinophil % : 0.8 %  Auto Basophil % : 0.4 %    11-16    142  |  100  |  23<H>  ----------------------------<  97  3.7   |  32  |  0.7    Ca    9.9      16 Nov 2022 15:27  Mg     2.0     11-16    TPro  6.9  /  Alb  4.6  /  TBili  0.5  /  DBili  x   /  AST  31  /  ALT  27  /  AlkPhos  72  11-16

## 2022-11-16 NOTE — ED PROVIDER NOTE - CLINICAL SUMMARY MEDICAL DECISION MAKING FREE TEXT BOX
72-year-old female with PMHx as noted, found to have A. fib in 140's  on monitor when she went in for pain management procedure.  Denies h/o A. fib in past.  EKG with NSR in ER.  Labs reviewed and unremarkable, troponin negative.  Case discussed with NP from EP -recommend admission for telemetry monitoring, echo, patient will need [facility] monitoring as outpatient.

## 2022-11-16 NOTE — ED PROVIDER NOTE - NS ED ROS FT
Constitutional: (-) fever  Eyes/ENT: (-) blurry vision, (-) epistaxis  Cardiovascular: (-) chest pain, (+) palpitations  Respiratory: (-) cough, (-) shortness of breath  Gastrointestinal: (-) vomiting, (-) diarrhea  Musculoskeletal: (-) neck pain, (-) back pain, (-) joint pain  Integumentary: (-) rash, (-) edema  Neurological: (-) headache, (-) altered mental status  Psychiatric: (-) hallucinations  Allergic/Immunologic: (-) pruritus

## 2022-11-16 NOTE — H&P ADULT - ASSESSMENT
72-year-old female with history of hypertension, high cholesterol, arthritis presents to the ED complaining of palpitations heart racing while prepping to have back injection at a pain management doctor's office.  Patient states she was told she was in A. fib at rate 140.'s  No chest pain, shortness of breath, weakness, dizziness, fever or chills.     73 y/o female with h/o htn, hld, scoliosis/chronic LBP, in ER for eval after possible episode of AFIB earlier today.  Pt states she was getting a nerve block today at pain management, felt very nervous beforehand, felt palpitations like her heart was racing.  Pt states when she was put on monitor in procedure note her HT was in 140's and pt was told by staff that she was in afib.  no EKG was done, pt sent to ER for eval.  pt states palpitations have now resolved.  no h/o afib.  no cp/sob.  no cough.  no f/c.  no abd pain.  no LE pain/swelling.  no ha/dizziness/syncope/near syncope.  PE - nad, nc/at, eomi, perrl, op -clear, mmm, cta b/l, no w/r/r, HR 70's, regular rhythm, abd - soft, nt/nd, nabs, from x 4, no LE swelling/tenderness, A&O x 3, no focal motor/sensory deficits.  -cardiac w/u, cardiac monitor.    #Afib w/RVR  #Palpitation  -EP eval    #Arthritis    #HTN    #DLD      #Misc  -DVT prophylaxis:  -GI prophylaxis:  -Diet:  -Code status:  -Activity:  -Dispo:    -Med rec confirmed with  72-year-old female with history of hypertension, high cholesterol, arthritis and scoliosis presents to the ED complaining of palpitations heart racing while prepping to have back injection at a pain management doctor's office. At baseline, patient uses a cane for ambulation.    #Paroxysmal Afib w/RVR- resolved  #Palpitation  -CBC, CMP wnl, BP wnl, CXR wnl  -EKG shows NSR  -EP eval in the AM  -c/w Metoprolol 12.5 BID  -AC as per EP    #Arthritis  -Stable    #HTN  -c/w Losartan 25 QD, Metoprolol as above  -Changed Chlorthalidone 25 QD to HCTZ 50 QD    #DLD  -Statin    #Misc  -DVT prophylaxis: Lovenox  -GI prophylaxis: Not indicated  -Diet: DASH  -Code status: Full code  -Activity: IAT  -Dispo: Home    -Med rec confirmed with patient

## 2022-11-16 NOTE — H&P ADULT - HISTORY OF PRESENT ILLNESS
72-year-old female with history of hypertension, high cholesterol, arthritis presents to the ED complaining of palpitations heart racing while prepping to have back injection at a pain management doctor's office.  Patient states she was told she was in A. fib at rate 140.'s  No chest pain, shortness of breath, weakness, dizziness, fever or chills.     73 y/o female with h/o htn, hld, scoliosis/chronic LBP, in ER for eval after possible episode of AFIB earlier today.  Pt states she was getting a nerve block today at pain management, felt very nervous beforehand, felt palpitations like her heart was racing.  Pt states when she was put on monitor in procedure note her HT was in 140's and pt was told by staff that she was in afib.  no EKG was done, pt sent to ER for eval.  pt states palpitations have now resolved.  no h/o afib.  no cp/sob.  no cough.  no f/c.  no abd pain.  no LE pain/swelling.  no ha/dizziness/syncope/near syncope.  PE - nad, nc/at, eomi, perrl, op -clear, mmm, cta b/l, no w/r/r, HR 70's, regular rhythm, abd - soft, nt/nd, nabs, from x 4, no LE swelling/tenderness, A&O x 3, no focal motor/sensory deficits.  -cardiac w/u, cardiac monitor. 72-year-old female with history of hypertension, high cholesterol, arthritis and scoliosis presents to the ED complaining of palpitations heart racing while prepping to have back injection at a pain management doctor's office. At baseline, patient uses a cane for ambulation.    Patient was in her usual state of health today, she went to her pain management doctor's office for back injection for scoliosis. At the office patient was very nervous and is also under a lot of stress because of personal problems. Before the procedure begun, patient started having palpitations, at the office, patient was found to be in afib with HR 130s as per patient. Denies HA, dizziness, NVD, fever, SOB, chest pain, abdominal pain, change in urination and change in bowel habits.     Of note, patient endorses having a 2 week tele monitoring ,  72-year-old female with history of hypertension, high cholesterol, arthritis and scoliosis presents to the ED complaining of palpitations heart racing while prepping to have back injection at a pain management doctor's office. At baseline, patient uses a cane for ambulation.    Patient was in her usual state of health today, she went to her pain management doctor's office for back injection for scoliosis. At the office patient was very nervous and is also under a lot of stress because of personal problems. Before the procedure begun, patient started having palpitations, at the office, patient was found to be in afib with HR 130s as per patient. Denies HA, dizziness, NVD, fever, SOB, chest pain, abdominal pain, change in urination and change in bowel habits.     Of note, patient endorses having a 2 week tele monitoring, 2 years ago, which showed some kind of irregular rhythm, possibly afib.    In the ED, CBC, CMP wnl, EKG shows NSR.    Vital Signs Last 24 Hrs:  T(F): 98.2 (16 Nov 2022 17:03), Max: 98.2 (16 Nov 2022 15:41)  HR: 75 (16 Nov 2022 17:03) (73 - 128)  BP: 120/58 (16 Nov 2022 17:03) (120/58 - 163/86)  RR: 19 (16 Nov 2022 17:03) (18 - 19)  SpO2: 98% (16 Nov 2022 17:03) (96% - 99%) on RA

## 2022-11-16 NOTE — ED ADULT TRIAGE NOTE - CHIEF COMPLAINT QUOTE
Patient was sent over from pain management for afib- Patient does have a history of afib- denies any palpitations or chest pain at this time

## 2022-11-16 NOTE — ED PROVIDER NOTE - OBJECTIVE STATEMENT
72-year-old female with history of hypertension, high cholesterol, arthritis presents to the ED complaining of palpitations heart racing while prepping to have back injection at a pain management doctor's office.  Patient states she was told she was in A. fib.  No chest pain, shortness of breath, weakness, dizziness, fever or chills. 72-year-old female with history of hypertension, high cholesterol, arthritis presents to the ED complaining of palpitations heart racing while prepping to have back injection at a pain management doctor's office.  Patient states she was told she was in A. fib at rate 140.'s  No chest pain, shortness of breath, weakness, dizziness, fever or chills.

## 2022-11-16 NOTE — H&P ADULT - NSHPPHYSICALEXAM_GEN_ALL_CORE
GENERAL: NAD, lying in bed comfortably  CHEST/LUNG: Clear to auscultation bilaterally; No rales, rhonchi, wheezing, or rubs. Unlabored respirations  HEART: Regular rate and rhythm; No murmurs, rubs, or gallops  ABDOMEN: Soft, Nontender, Nondistended  EXTREMITIES:  No clubbing, cyanosis, or edema  NERVOUS SYSTEM:  Alert & Oriented X3, speech clear. No deficits   MSK: FROM all 4 extremities, full and equal strength  SKIN: No rashes or lesions on area examined

## 2022-11-16 NOTE — ED PROVIDER NOTE - NS ED ATTENDING STATEMENT MOD
This was a shared visit with the AB. I reviewed and verified the documentation and independently performed the documented:

## 2022-11-16 NOTE — ED PROVIDER NOTE - ATTENDING APP SHARED VISIT CONTRIBUTION OF CARE
73 y/o female with h/o htn, hld, scoliosis/chronic LBP, in ER for eval after possible episode of AFIB earlier today.  Pt states she was getting a nerve block today at pain management, felt very nervous beforehand, felt palpitations like her heart was racing.  Pt states when she was put on monitor in procedure note her HT was in 140's and pt was told by staff that she was in afib.  no EKG was done, pt sent to ER for eval.  pt states palpitations have now resolved.  no h/o afib.  no cp/sob.  no cough.  no f/c.  no abd pain.  no LE pain/swelling.  no ha/dizziness/syncope/near syncope.  PE - nad, nc/at, eomi, perrl, op -clear, mmm, cta b/l, no w/r/r, HR 70's, regular rhythm, abd - soft, nt/nd, nabs, from x 4, no LE swelling/tenderness, A&O x 3, no focal motor/sensory deficits.  -cardiac w/u, cardiac monitor.

## 2022-11-16 NOTE — ED ADULT NURSE NOTE - OBJECTIVE STATEMENT
Patient stated "I was at pain management, and they sent me here because they said my heart was going too fast." Denies chest pain or SOB.

## 2022-11-17 ENCOUNTER — TRANSCRIPTION ENCOUNTER (OUTPATIENT)
Age: 72
End: 2022-11-17

## 2022-11-17 VITALS
TEMPERATURE: 98 F | HEART RATE: 65 BPM | OXYGEN SATURATION: 99 % | RESPIRATION RATE: 18 BRPM | SYSTOLIC BLOOD PRESSURE: 111 MMHG | DIASTOLIC BLOOD PRESSURE: 56 MMHG

## 2022-11-17 LAB
A1C WITH ESTIMATED AVERAGE GLUCOSE RESULT: 5.9 % — HIGH (ref 4–5.6)
ALBUMIN SERPL ELPH-MCNC: 4.5 G/DL — SIGNIFICANT CHANGE UP (ref 3.5–5.2)
ALP SERPL-CCNC: 70 U/L — SIGNIFICANT CHANGE UP (ref 30–115)
ALT FLD-CCNC: 25 U/L — SIGNIFICANT CHANGE UP (ref 0–41)
ANION GAP SERPL CALC-SCNC: 9 MMOL/L — SIGNIFICANT CHANGE UP (ref 7–14)
APTT BLD: 28.9 SEC — SIGNIFICANT CHANGE UP (ref 27–39.2)
AST SERPL-CCNC: 28 U/L — SIGNIFICANT CHANGE UP (ref 0–41)
BASOPHILS # BLD AUTO: 0.03 K/UL — SIGNIFICANT CHANGE UP (ref 0–0.2)
BASOPHILS NFR BLD AUTO: 0.6 % — SIGNIFICANT CHANGE UP (ref 0–1)
BILIRUB SERPL-MCNC: 0.6 MG/DL — SIGNIFICANT CHANGE UP (ref 0.2–1.2)
BUN SERPL-MCNC: 25 MG/DL — HIGH (ref 10–20)
CALCIUM SERPL-MCNC: 9.7 MG/DL — SIGNIFICANT CHANGE UP (ref 8.4–10.5)
CHLORIDE SERPL-SCNC: 101 MMOL/L — SIGNIFICANT CHANGE UP (ref 98–110)
CHOLEST SERPL-MCNC: 188 MG/DL — SIGNIFICANT CHANGE UP
CO2 SERPL-SCNC: 32 MMOL/L — SIGNIFICANT CHANGE UP (ref 17–32)
CREAT SERPL-MCNC: 0.7 MG/DL — SIGNIFICANT CHANGE UP (ref 0.7–1.5)
EGFR: 92 ML/MIN/1.73M2 — SIGNIFICANT CHANGE UP
EOSINOPHIL # BLD AUTO: 0.15 K/UL — SIGNIFICANT CHANGE UP (ref 0–0.7)
EOSINOPHIL NFR BLD AUTO: 3 % — SIGNIFICANT CHANGE UP (ref 0–8)
ESTIMATED AVERAGE GLUCOSE: 123 MG/DL — HIGH (ref 68–114)
GLUCOSE SERPL-MCNC: 103 MG/DL — HIGH (ref 70–99)
HCT VFR BLD CALC: 35.9 % — LOW (ref 37–47)
HCV AB S/CO SERPL IA: 0.04 COI — SIGNIFICANT CHANGE UP
HCV AB SERPL-IMP: SIGNIFICANT CHANGE UP
HDLC SERPL-MCNC: 82 MG/DL — SIGNIFICANT CHANGE UP
HGB BLD-MCNC: 12 G/DL — SIGNIFICANT CHANGE UP (ref 12–16)
IMM GRANULOCYTES NFR BLD AUTO: 0.2 % — SIGNIFICANT CHANGE UP (ref 0.1–0.3)
INR BLD: 0.99 RATIO — SIGNIFICANT CHANGE UP (ref 0.65–1.3)
LIPID PNL WITH DIRECT LDL SERPL: 87 MG/DL — SIGNIFICANT CHANGE UP
LYMPHOCYTES # BLD AUTO: 1.24 K/UL — SIGNIFICANT CHANGE UP (ref 1.2–3.4)
LYMPHOCYTES # BLD AUTO: 24.7 % — SIGNIFICANT CHANGE UP (ref 20.5–51.1)
MCHC RBC-ENTMCNC: 31.3 PG — HIGH (ref 27–31)
MCHC RBC-ENTMCNC: 33.4 G/DL — SIGNIFICANT CHANGE UP (ref 32–37)
MCV RBC AUTO: 93.5 FL — SIGNIFICANT CHANGE UP (ref 81–99)
MONOCYTES # BLD AUTO: 0.5 K/UL — SIGNIFICANT CHANGE UP (ref 0.1–0.6)
MONOCYTES NFR BLD AUTO: 10 % — HIGH (ref 1.7–9.3)
NEUTROPHILS # BLD AUTO: 3.09 K/UL — SIGNIFICANT CHANGE UP (ref 1.4–6.5)
NEUTROPHILS NFR BLD AUTO: 61.5 % — SIGNIFICANT CHANGE UP (ref 42.2–75.2)
NON HDL CHOLESTEROL: 106 MG/DL — SIGNIFICANT CHANGE UP
NRBC # BLD: 0 /100 WBCS — SIGNIFICANT CHANGE UP (ref 0–0)
PLATELET # BLD AUTO: 243 K/UL — SIGNIFICANT CHANGE UP (ref 130–400)
POTASSIUM SERPL-MCNC: 3.8 MMOL/L — SIGNIFICANT CHANGE UP (ref 3.5–5)
POTASSIUM SERPL-SCNC: 3.8 MMOL/L — SIGNIFICANT CHANGE UP (ref 3.5–5)
PROT SERPL-MCNC: 6.7 G/DL — SIGNIFICANT CHANGE UP (ref 6–8)
PROTHROM AB SERPL-ACNC: 11.3 SEC — SIGNIFICANT CHANGE UP (ref 9.95–12.87)
RBC # BLD: 3.84 M/UL — LOW (ref 4.2–5.4)
RBC # FLD: 13.5 % — SIGNIFICANT CHANGE UP (ref 11.5–14.5)
SODIUM SERPL-SCNC: 142 MMOL/L — SIGNIFICANT CHANGE UP (ref 135–146)
T4 AB SER-ACNC: 10.5 UG/DL — SIGNIFICANT CHANGE UP (ref 4.6–12)
T4 FREE SERPL-MCNC: 1.4 NG/DL — SIGNIFICANT CHANGE UP (ref 0.9–1.8)
TRIGL SERPL-MCNC: 92 MG/DL — SIGNIFICANT CHANGE UP
TROPONIN T SERPL-MCNC: <0.01 NG/ML — SIGNIFICANT CHANGE UP
TSH SERPL-MCNC: 3.1 UIU/ML — SIGNIFICANT CHANGE UP (ref 0.27–4.2)
WBC # BLD: 5.02 K/UL — SIGNIFICANT CHANGE UP (ref 4.8–10.8)
WBC # FLD AUTO: 5.02 K/UL — SIGNIFICANT CHANGE UP (ref 4.8–10.8)

## 2022-11-17 PROCEDURE — 99223 1ST HOSP IP/OBS HIGH 75: CPT

## 2022-11-17 PROCEDURE — 93010 ELECTROCARDIOGRAM REPORT: CPT

## 2022-11-17 PROCEDURE — 93306 TTE W/DOPPLER COMPLETE: CPT | Mod: 26

## 2022-11-17 PROCEDURE — 99239 HOSP IP/OBS DSCHRG MGMT >30: CPT

## 2022-11-17 RX ORDER — ATORVASTATIN CALCIUM 80 MG/1
1 TABLET, FILM COATED ORAL
Qty: 0 | Refills: 0 | DISCHARGE
Start: 2022-11-17

## 2022-11-17 RX ORDER — ATORVASTATIN CALCIUM 80 MG/1
1 TABLET, FILM COATED ORAL
Qty: 0 | Refills: 0 | DISCHARGE

## 2022-11-17 RX ADMIN — LOSARTAN POTASSIUM 25 MILLIGRAM(S): 100 TABLET, FILM COATED ORAL at 05:42

## 2022-11-17 RX ADMIN — Medication 50 MILLIGRAM(S): at 07:48

## 2022-11-17 RX ADMIN — Medication 12.5 MILLIGRAM(S): at 07:48

## 2022-11-17 RX ADMIN — Medication 12.5 MILLIGRAM(S): at 17:21

## 2022-11-17 NOTE — DISCHARGE NOTE PROVIDER - CARE PROVIDER_API CALL
Nash Chase)  Critical Care Medicine; Internal Medicine; Pulmonary Disease; Sleep Medicine  [address] [place] [address]  Phone: [phone]  Fax: [phone]  Follow Up Time: 1 week    FELICITY ARNOLD  Internal Medicine  [address] [place] [address]  Phone: ([phone]  Fax: ()-  Follow Up Time: 2 weeks    Alfredo Kennedy)  Cardiac Electrophysiology; Cardiovascular Disease; [facility]  [address] [place] [address]  Phone: [phone]  Fax: [phone]  Follow Up Time: 1 month

## 2022-11-17 NOTE — ED ADULT NURSE REASSESSMENT NOTE - NS ED NURSE REASSESS COMMENT FT1
pt received MCOT from EP and instruction was given, pt provided dc summary who verbalized understanding, pt left ED with , ambulatory with steady gait, NAD noted.

## 2022-11-17 NOTE — DISCHARGE NOTE PROVIDER - PROVIDER TOKENS
PROVIDER:[TOKEN:[[id number]],FOLLOWUP:[1 week]],PROVIDER:[TOKEN:[[id number]],FOLLOWUP:[2 weeks]],PROVIDER:[TOKEN:[[id number]],FOLLOWUP:[1 month]]

## 2022-11-17 NOTE — CONSULT NOTE ADULT - ASSESSMENT
************INCOMPLETE NOTE**************      72-year-old female with history of hypertension, high cholesterol, arthritis and scoliosis presents to the ED complaining of palpitations heart racing while prepping to have back injection at a pain management doctor's office. At baseline, patient uses a cane for ambulation.    # Suspected Arrythmia   # Palpitation  # HTN / HLD  - rate controlled in NSR at encounter   - no events on tele   - trop <0.01  - EKG (on admission): NSR  - check echo   - check TSH   - c/w losartan, Metoprolol, HCTZ and statin  - d/c A/C as diagnosis is not confirmed    - MCOT placement prior to discharge     ************INCOMPLETE NOTE************** 72-year-old female with history of hypertension, high cholesterol, arthritis and scoliosis presents to the ED complaining of palpitations heart racing while prepping to have back injection at a pain management doctor's office. At baseline, patient uses a cane for ambulation.    # Suspected Arrythmia   # Palpitation  # HTN / HLD  - rate controlled in NSR at encounter   - no events on tele   - trop <0.01  - EKG (on admission): NSR  - check echo   - check TSH   - c/w losartan, Metoprolol, HCTZ and statin  - MCOT placement prior to discharge

## 2022-11-17 NOTE — DISCHARGE NOTE PROVIDER - NSDCMRMEDTOKEN_GEN_ALL_CORE_FT
atorvastatin 20 mg oral tablet: 1 tab(s) orally once a day (at bedtime)  chlorthalidone 25 mg oral tablet: 1 tab(s) orally once a day  losartan 25 mg oral tablet: 1 tab(s) orally once a day  metoprolol succinate 25 mg oral tablet, extended release: 1 tab(s) orally once a day

## 2022-11-17 NOTE — CONSULT NOTE ADULT - SUBJECTIVE AND OBJECTIVE BOX
Outpt cardiologist:    HPI:  72-year-old female with history of hypertension, high cholesterol, arthritis and scoliosis presents to the ED complaining of palpitations heart racing while prepping to have back injection at a pain management doctor's office. At baseline, patient uses a cane for ambulation.    Patient was in her usual state of health today, she went to her pain management doctor's office for back injection for scoliosis. At the office patient was very nervous and is also under a lot of stress because of personal problems. Before the procedure begun, patient started having palpitations, at the office, patient was found to be in afib with HR 130s as per patient. Denies HA, dizziness, NVD, fever, SOB, chest pain, abdominal pain, change in urination and change in bowel habits.     Of note, patient endorses having a 2 week tele monitoring, 2 years ago, which showed some kind of irregular rhythm, possibly afib.    In the ED, CBC, CMP wnl, EKG shows NSR.    Vital Signs Last 24 Hrs:  T(F): 98.2 (2022 17:03), Max: 98.2 (2022 15:41)  HR: 75 (2022 17:03) (73 - 128)  BP: 120/58 (2022 17:03) (120/58 - 163/86)  RR: 19 (2022 17:03) (18 - 19)  SpO2: 98% (2022 17:03) (96% - 99%) on RA   (2022 19:41)      ---  cardio fellow additional notes:        PAST MEDICAL & SURGICAL HISTORY  HTN (hypertension)    OA (osteoarthritis)    Autonomous thyroid nodule    Lung nodule    H/O removal of cyst        FAMILY HISTORY:  FAMILY HISTORY:  Family history of diabetes mellitus (DM)        SOCIAL HISTORY:  Social History:      ALLERGIES:  No Known Allergies      MEDICATIONS:  atorvastatin 20 milliGRAM(s) Oral at bedtime  enoxaparin Injectable 40 milliGRAM(s) SubCutaneous every 24 hours  hydrochlorothiazide 50 milliGRAM(s) Oral daily  losartan 25 milliGRAM(s) Oral daily  metoprolol tartrate 12.5 milliGRAM(s) Oral every 12 hours    PRN:      HOME MEDICATIONS:  Home Medications:  atorvastatin 20 mg oral tablet: 1 tab(s) orally once a day (2022 20:11)  chlorthalidone 25 mg oral tablet: 1 tab(s) orally once a day (2022 20:11)  losartan 25 mg oral tablet: 1 tab(s) orally once a day (2022 20:11)  metoprolol succinate 25 mg oral tablet, extended release: 1 tab(s) orally once a day (2022 20:11)      VITALS:   T(F): 97.9 ( @ 08:13), Max: 98.2 ( @ 15:41)  HR: 66 ( @ 08:13) (61 - 128)  BP: 147/66 ( @ 08:13) (111/55 - 163/86)  BP(mean): 84 ( @ 17:03) (84 - 84)  RR: 19 ( @ 08:13) (18 - 19)  SpO2: 99% ( @ 08:13) (96% - 99%)    I&O's Summary      REVIEW OF SYSTEMS:  CONSTITUTIONAL: No weakness, fevers or chills  HEENT: No visual changes, neck/ear pain  RESPIRATORY: No cough, sob  CARDIOVASCULAR: See HPI  GASTROINTESTINAL: No abdominal pain. No nausea, vomiting, diarrhea   GENITOURINARY: No dysuria, frequency or hematuria  NEUROLOGICAL: No new focal deficits  SKIN: No new rashes      PHYSICAL EXAM:  General: Not in distress.  Non-toxic appearing.   HEENT: EOMI  Cardio: regular, S1, S2, no murmur  Pulm: B/L BS.  No wheezing / crackles / rales  Abdomen: Soft, non-tender, non-distended. Normoactive bowel sounds  Extremities: No edema b/l le  Neuro: A&O x3. No focal deficits    LABS:                        12.0   5.02  )-----------( 243      ( 2022 06:20 )             35.9     -    142  |  101  |  25<H>  ----------------------------<  103<H>  3.8   |  32  |  0.7    Ca    9.7      2022 06:20  Mg     2.0     -    TPro  6.7  /  Alb  4.5  /  TBili  0.6  /  DBili  x   /  AST  28  /  ALT  25  /  AlkPhos  70  -    PT/INR - ( 2022 06:20 )   PT: 11.30 sec;   INR: 0.99 ratio         PTT - ( 2022 06:20 )  PTT:28.9 sec  Troponin T, Serum: 0.01 ng/mL (22 @ 15:27)    CARDIAC MARKERS ( 2022 15:27 )  x     / 0.01 ng/mL / x     / x     / x            Troponin trend:       Chol 188 LDL -- HDL 82 Trig 92  COVID-19 PCR: NotDetec (2022 16:38)      RADIOLOGY:  Xray Chest 1 View- PORTABLE-Urgent (22): No radiographic evidence of acute cardiopulmonary disease.      EC Lead ECG:   Ventricular Rate 73 BPM    Atrial Rate 73 BPM    P-R Interval 152 ms    QRS Duration 80 ms    Q-T Interval 394 ms    QTC Calculation(Bazett) 434 ms    P Axis 86 degrees    R Axis 80 degrees    T Axis 73 degrees    Diagnosis Line Normal sinus rhythm  Normal ECG    Confirmed by randa he ([id number]) on 2022 5:36:23 PM ( @ 14:04)      TELEMETRY EVENTS:

## 2022-11-17 NOTE — DISCHARGE NOTE PROVIDER - ATTENDING DISCHARGE PHYSICAL EXAMINATION:
Attending attestation  Attending DC note  Pt seen and examined at bedside.  No cp or sob.  Echo wnl, outpt cardiology follow up. MCOT placed. outpt sleep study  vitals, labs, exam stable  Hospital course as above.  Plan dw pt and agreed to plan  Medically cleared for DC. Med recc completed.  DW resident. Spent 32 mins on case

## 2022-11-17 NOTE — DISCHARGE NOTE PROVIDER - HOSPITAL COURSE
HPI:  72-year-old female with history of hypertension, high cholesterol, arthritis and scoliosis presents to the ED complaining of palpitations heart racing while prepping to have back injection at a pain management doctor's office. At baseline, patient uses a cane for ambulation.    Patient was in her usual state of health today, she went to her pain management doctor's office for back injection for scoliosis. At the office patient was very nervous and is also under a lot of stress because of personal problems. Before the procedure begun, patient started having palpitations, at the office, patient was found to be in afib with HR 130s as per patient. Denies HA, dizziness, NVD, fever, SOB, chest pain, abdominal pain, change in urination and change in bowel habits.     Of note, patient endorses having a 2 week tele monitoring, 2 years ago, which showed some kind of irregular rhythm, possibly afib.    In the ED, CBC, CMP wnl, EKG shows NSR.    Vital Signs Last 24 Hrs:  T(F): 98.2 (16 Nov 2022 17:03), Max: 98.2 (16 Nov 2022 15:41)  HR: 75 (16 Nov 2022 17:03) (73 - 128)  BP: 120/58 (16 Nov 2022 17:03) (120/58 - 163/86)  RR: 19 (16 Nov 2022 17:03) (18 - 19)  SpO2: 98% (16 Nov 2022 17:03) (96% - 99%) on RA   (16 Nov 2022 19:41)    No signs of afib during the stay  TSH   E/P recommended DC on MCOt and off anticoags  Patient will follow up with EP for MCOT and with Pulm for possible CORAL   HPI:  72-year-old female with history of hypertension, high cholesterol, arthritis and scoliosis presents to the ED complaining of palpitations heart racing while prepping to have back injection at a pain management doctor's office. At baseline, patient uses a cane for ambulation.    Patient was in her usual state of health today, she went to her pain management doctor's office for back injection for scoliosis. At the office patient was very nervous and is also under a lot of stress because of personal problems. Before the procedure begun, patient started having palpitations, at the office, patient was found to be in afib with HR 130s as per patient. Denies HA, dizziness, NVD, fever, SOB, chest pain, abdominal pain, change in urination and change in bowel habits.     Of note, patient endorses having a 2 week tele monitoring, 2 years ago, which showed some kind of irregular rhythm, possibly afib.    In the ED, CBC, CMP wnl, EKG shows NSR.    Vital Signs Last 24 Hrs:  T(F): 98.2 (16 Nov 2022 17:03), Max: 98.2 (16 Nov 2022 15:41)  HR: 75 (16 Nov 2022 17:03) (73 - 128)  BP: 120/58 (16 Nov 2022 17:03) (120/58 - 163/86)  RR: 19 (16 Nov 2022 17:03) (18 - 19)  SpO2: 98% (16 Nov 2022 17:03) (96% - 99%) on RA   (16 Nov 2022 19:41)    No signs of afib during the stay  TSH   E/P recommended DC on MCOt and off anticoags  Patient will follow up with EP for MCOT and with Pulm for possible CORAL  < from: TTE Echo Complete w/o Contrast w/ Doppler (11.17.22 @ 11:55) >    Summary:   1. LV Ejection Fraction by Linares's Method with a biplane EF of 66 %.   2. Mildly increased LV wall thickness.   3. Spectral Doppler shows impaired relaxation pattern of left   ventricular myocardial filling (Grade I diastolic dysfunction).   4. Normal left atrial size.   5. Normal right atrial size.   6. Mild mitral valve regurgitation.   7. Mild-moderate tricuspid regurgitation.    < end of copied text >

## 2022-11-17 NOTE — DISCHARGE NOTE PROVIDER - CARE PROVIDERS DIRECT ADDRESSES
,DirectAddress_Unknown,DirectAddress_Unknown,[email or web address]

## 2022-11-17 NOTE — DISCHARGE NOTE NURSING/CASE MANAGEMENT/SOCIAL WORK - PATIENT PORTAL LINK FT
You can access the FollowMyHealth Patient Portal offered by [facility] by registering at the following website: [email or web address]. By joining [employer]’s FollowMyHealth portal, you will also be able to view your health information using other applications (apps) compatible with our system.

## 2022-11-17 NOTE — CONSULT NOTE ADULT - ATTENDING COMMENTS
complex patient  Possible arrhythmias - prior APCs  no evidence of Atrial Fibrillation  recommend MCOT at discharge

## 2022-11-17 NOTE — DISCHARGE NOTE PROVIDER - NSDCCPCAREPLAN_GEN_ALL_CORE_FT
PRINCIPAL DISCHARGE DIAGNOSIS  Diagnosis: Palpitations  Assessment and Plan of Treatment: Palpitations  A palpitation is the feeling that your heartbeat is irregular or is faster than normal. It may feel like your heart is fluttering or skipping a beat. They may be caused by many things, including smoking, caffeine, alcohol, stress, and certain medicines. Although most causes of palpitations are not serious, palpitations can be a sign of a serious medical problem. Avoid caffeine, alcohol, and tobacco products at home. Try to reduce stress and anxiety and make sure to get plenty of rest.   SEEK IMMEDIATE MEDICAL CARE IF YOU HAVE ANY OF THE FOLLOWING SYMPTOMS: chest pain, shortness of breath, severe headache, dizziness/lightheadedness, or fainting.  Please follow up with cardiologist for the results of the MCOT; with pulmonologist for sleep study        SECONDARY DISCHARGE DIAGNOSES  Diagnosis: Paroxysmal atrial fibrillation  Assessment and Plan of Treatment:

## 2022-12-02 DIAGNOSIS — I10 ESSENTIAL (PRIMARY) HYPERTENSION: ICD-10-CM

## 2022-12-02 DIAGNOSIS — M41.9 SCOLIOSIS, UNSPECIFIED: ICD-10-CM

## 2022-12-02 DIAGNOSIS — M19.90 UNSPECIFIED OSTEOARTHRITIS, UNSPECIFIED SITE: ICD-10-CM

## 2022-12-02 DIAGNOSIS — I48.0 PAROXYSMAL ATRIAL FIBRILLATION: ICD-10-CM

## 2022-12-02 DIAGNOSIS — E78.00 PURE HYPERCHOLESTEROLEMIA, UNSPECIFIED: ICD-10-CM

## 2022-12-02 DIAGNOSIS — R91.1 SOLITARY PULMONARY NODULE: ICD-10-CM

## 2022-12-02 DIAGNOSIS — E04.1 NONTOXIC SINGLE THYROID NODULE: ICD-10-CM

## 2022-12-02 DIAGNOSIS — Z83.3 FAMILY HISTORY OF DIABETES MELLITUS: ICD-10-CM

## 2022-12-02 DIAGNOSIS — R00.2 PALPITATIONS: ICD-10-CM

## 2022-12-12 ENCOUNTER — NON-APPOINTMENT (OUTPATIENT)
Age: 72
End: 2022-12-12

## 2022-12-14 ENCOUNTER — APPOINTMENT (OUTPATIENT)
Age: 72
End: 2022-12-14

## 2022-12-14 VITALS
RESPIRATION RATE: 16 BRPM | HEART RATE: 92 BPM | OXYGEN SATURATION: 96 % | HEIGHT: 62 IN | BODY MASS INDEX: 22.26 KG/M2 | WEIGHT: 121 LBS | DIASTOLIC BLOOD PRESSURE: 70 MMHG | SYSTOLIC BLOOD PRESSURE: 132 MMHG

## 2022-12-14 DIAGNOSIS — J98.4 OTHER DISORDERS OF LUNG: ICD-10-CM

## 2022-12-14 PROCEDURE — 99213 OFFICE O/P EST LOW 20 MIN: CPT

## 2022-12-14 NOTE — DISCUSSION/SUMMARY
[FreeTextEntry1] : SOB ON EXERTION/ SCOLIOSIS/ EXTRATHORACIC RESTRICTION/ PFT NL\par LUNG NODULE STABLE\par PRIOR RECORD REVIEWED\par HST\par

## 2022-12-28 ENCOUNTER — APPOINTMENT (OUTPATIENT)
Dept: SLEEP CENTER | Facility: HOSPITAL | Age: 72
End: 2022-12-28

## 2022-12-28 ENCOUNTER — OUTPATIENT (OUTPATIENT)
Dept: OUTPATIENT SERVICES | Facility: HOSPITAL | Age: 72
LOS: 1 days | Discharge: HOME | End: 2022-12-28
Payer: MEDICARE

## 2022-12-28 DIAGNOSIS — Z98.890 OTHER SPECIFIED POSTPROCEDURAL STATES: Chronic | ICD-10-CM

## 2022-12-28 PROCEDURE — 95800 SLP STDY UNATTENDED: CPT | Mod: 26

## 2022-12-29 DIAGNOSIS — G47.33 OBSTRUCTIVE SLEEP APNEA (ADULT) (PEDIATRIC): ICD-10-CM

## 2023-01-13 NOTE — ASU PREOP CHECKLIST - VERIFY SURGICAL SITE/SIDE WITH PATIENT
Discharge Summary  [facility]    Patient Name Barrett Storey   MRN: [id number]   YOB: 1952       Admit date: 1/9/2023   Discharge date:  1/13/2023       Disposition:                   Home    Admitting Physician: Barrett Pederson MD   Primary care provider: No Pcp  Discharge Physician: Nicol Cornell MD    Primary Discharge Diagnoses:  1. Cough(Viral URI)    Principal Problem:    Cough, unspecified type  Active Problems:    Essential hypertension    Obstructive sleep apnea (adult) (pediatric)    Paroxysmal atrial fibrillation (CMS/HCC)    Chronic diastolic CHF (congestive heart failure) (CMS/[facility])    Coronary artery disease involving native coronary artery of native heart without angina pectoris  Resolved Problems:    * No resolved hospital problems. *      Past Medical History:    Hodgkin's lymphoma (CMS/HCC)                    07/2003         Comment: Dr. Hernandez    Chronic sinusitis                                             Depression                                                    Amblyopia                                                     BPH (benign prostatic hypertrophy)                            PONV (postoperative nausea and vomiting)                      Status post chemotherapy                        2003          Colon polyps                                                  Osteoarthritis                                                  Comment: right knee- scheduled for replacement soon -                shoulder, neck    Gastroesophageal reflux disease                               Peripheral edema                                                Comment: bilateral     Pneumonia                                                     Urinary incontinence                                          Diverticulosis of colon (without mention of he* 02/19/2015    Diaphragmatic hernia without mention of obstru* 02/19/2015    Basal cell carcinoma of neck                    03/24/2015       Comment: Excised 3/24/2015 - very close margins     PMR (polymyalgia rheumatica) (CMS/[facility])                          Comment: [facility] symptoms c/w                 2/13/2017    Hepatitis cholestatic                                         S/P CABG x 1                                    05/27/2021    S/P modified Maze operation for atrial fibrill* 05/27/2021    S/P left atrial appendage ligation              05/27/2021    Acquired pes planovalgus                        05/01/2018    Arthritis of midfoot                            05/01/2018    Paroxysmal atrial fibrillation (CMS/HCC)        07/01/2019    Chronic diastolic CHF (congestive heart failur* 07/01/2019    Essential hypertension                          11/21/2011    Insomnia, unspecified                           02/04/2013      Comment: psychophysiologic, chronic use of hypnotic                medication.     Myopia of both eyes with astigmatism and presb* 04/06/2017    Non-rheumatic mitral regurgitation              01/28/2016      Comment: 1/19/2016 ECHO: Moderate mitral valve                regurgitation     Obstructive sleep apnea (adult) (pediatric)     02/04/2013      Comment: Wears cpap    Port catheter in place                          10/08/2014    PVD (posterior vitreous detachment), both eyes  10/02/2017    Coronary artery disease involving native coron* 09/24/2021    Lumbar radiculopathy                            9/24/2021     Consultations:  None  Transfusions:  None  Procedures:  None    Hospital Course:  The patient  is a 70 year old man who presented on 1/9/2023 with complaints of Weakness, Diarrhea and Cough   The patient complained his symptoms progressed for  about 1 week prior to admission.  He complained of a continuous cough which is productive of mucus but denied any significant shortness of breath or wheezing.  He denied any chest pain.  Cough was productive.  He denied any  objective fever although he did complain  of chills, no abdominal pain but loose stools were reported.  He also reported that his urine appeared darker than usual and he appeared to be urinating less.  He also reported poor oral intake over several days prior to admission due to poor appetite.  He was seen at an urgent care a few days prior to admission with upper respiratory infection symptoms of dry cough and body aches  SARS-CoV-2, influenza a and B and RSV were negative.  He was managed as a case of upper respiratory infection.  On admission here the patient continued to have bouts of cough, loose stools resolved after 1 day.  Respiratory panel was completed and showed Coronavirus NL 63 isolated.  All other viruses were negative.  While on admission, repeat chest x-ray showed no evidence of active disease can urinalysis showed no evidence of a urinary tract infection.  Although BNP was slightly elevated, close no evidence of acute on chronic CHF exacerbation, patient was not fluid overloaded.   In addition, the patient reported signs of congestion in his sinuses, doxycycline was started and he will be discharged on 5 more days to complete the course of treatment.    He has also been discharged with an albuterol inhaler to help with the bronchospasms.    Lasix was held temporarily, IV fluids were given.  Lasix has been resumed prior to discharge.  The patient was assessed by Occupational therapy and determined to be independent of all activities of daily living.        The patient will be discharged in good condition.         Code status: Full Resuscitation    Discharge Labs:  Recent Labs   Lab 01/13/23  0615 01/12/23  0604 01/11/23  0612 01/10/23  0604 01/09/23  2045   SODIUM 140 137 139 139  --    POTASSIUM 4.3 4.2 4.3 3.9  --    CHLORIDE 102 101 105 104  --    CO2 30 29 26 28  --    BUN 16 16 18 21*  --    CREATININE 0.98 0.92 0.91 1.13  --    GLUCOSE 99 100* 103* 113*  --    WBC  --  9.8 9.9 10.5  --    HGB  --  13.0 13.0 13.4  --    HCT  --  37.7*  37.5* 38.8*  --    PLT  --  239 212 197  --    PT  --   --   --   --  12.7*   INR  --   --   --   --  1.3       Microbiology Results     None          Significant Diagnostic Studies and Procedures:  XR CHEST AP OR PA - PORTABLE    Result Date: 1/9/2023  Narrative: EXAMINATION: Portable AP chest radiograph HISTORY: Cough. COMPARISON: Chest radiograph 12/8/2022 FINDINGS: Normal cardiomediastinal silhouette. CABG. Atrial appendage clip. Clear lungs. Normal pulmonary vasculature. No visible pleural effusion or pneumothorax. No acute osseous abnormality. The upper abdomen and soft tissues are unremarkable. Left chest port with catheter tip projecting over the central SVC.     Impression: IMPRESSION: No acute cardiopulmonary findings.     XR CHEST PA AND LATERAL    Result Date: 1/11/2023  Narrative: XR CHEST PA AND LATERAL HISTORY:  . Persistent respiratory symptoms. COMPARISON: 1/10/2023 TECHNIQUE:  Frontal and lateral views of the chest. FINDINGS: There is poor inspiration. Tortuosity of descending aorta is noted. The cardiomediastinal contour and pulmonary vasculature are within normal limits. No focal consolidation. No pleural effusion or pneumothorax. The visualized osseous structures demonstrate no acute abnormality.     Impression: IMPRESSION: 1.  No evidence for active disease in the chest.    XR Chest PA and Lateral    Result Date: 1/10/2023  Narrative: EXAM: XR CHEST PA AND LATERAL HISTORY: cough x 2 weeks COMPARISON: 12/8/2022. 1/9/2023 FINDINGS: Similar cardiac surgical changes. Median sternotomy. No pleural effusion or pneumothorax. No focal airspace consolidation. No visualized acute osseous abnormality. Left chest wall port in place with catheter tip at the superior cavoatrial junction.     Impression: IMPRESSION: No acute cardiopulmonary process. No significant interval change in comparison to prior imaging      Pending  Results:  Unresulted Labs (From admission, onward)     Start     Ordered    01/13/23  0640  Extra Tubes  Once Routine,   Routine         01/13/23 0639    01/13/23 0640  Lavender Top  PROCEDURE ONCE,   Routine         01/13/23 0639              Unresulted Procedure (From admission, onward)    None          Discharge Exam: 1/13/2023  Blood pressure 131/81, pulse 98, temperature 98 °F (36.7 °C), temperature source Oral, resp. rate 20, height 5' 11\" (1.803 m), weight 109.2 kg (240 lb 11.9 oz), SpO2 98 %.  General - Patient is alert, oriented and in no acute distress.   HEENT - Normocephalic and atraumatic.  Pupils equally round and reactive to light. Sclerae are anicteric. No conjunctivitis or subconjunctival lesions.  External auditory canals and nasal mucosa are clear.  Oropharynx is clear, normal lips teeth and gums with moist mucous membranes.  No posterior oropharyngeal erythema or exudates or thrush.    Neck - Soft and supple, no carotid bruits.  No thyromegaly.  Coronary - Regular rate and rhythm without murmurs, rubs or gallops.   Pulmonary - Normal respiratory effort.  Lungs are clear to auscultation bilaterally without wheezes rubs or rhonchi.   Abdomen - Soft, non-tender and non-distended. Bowel sounds are normoactive. No guarding or rebound tenderness. No Hepatosplenomegaly, palpable masses or hernias.  No suprapubic tenderness.  Extremities  -  Warm without clubbing, cyanosis or edema.  Normal range of motion.  Skin - No rashes or lesions. Warm and dry.  No decubitus ulcers.  Neurologic - Alert and oriented to person, place and time.  CNs II-XII are intact. Strength, sensation, and tone are grossly intact.  No focal deficits.     Patient Discharge Instructions:   1. Activity: activity as tolerated  2. Diet: Sodium 2 Gm (low Sodium) Diet  3. Wound Care:   Wound Chest Midline/Middle Incision (Active)   Date First Assessed/Time First Assessed: 05/27/21 1231   Present on Hospital Admission: No  Location: Chest  Modifier: Midline/Middle  Primary Wound Type: Incision      Assessments 5/27/2021   1:52 PM 6/7/2021  8:00 AM   Dressing Assessment Clean;Dry;Intact --   Dressing Activity Applied --   Wound Exudate -- None   Cleansing Agent Other (comment) Commercial cleansing solution   Wound Bed/Tissue Type -- Intact   Periwound Condition -- Normal   Wound Edge Approximated;Sutured Approximated   Wound Dressing Negative pressure device None       No Linked orders to display       Negative Pressure Device 05/27/21 Chest Midline Prevena (Active)   Placement Date/Time: 05/27/21 1500   Inserted By: Antolin Lorenzo MD  Present at Time of Admission: No  Wound Site Association: Chest Midline  Type: Prevena      Assessments 5/27/2021  1:52 PM 5/30/2021  4:00 PM   Setting (mmHg) Continuous 125;Continuous   VAC Canister Changed On -- 05/27/21   Dressing Other (comment) Other (comment)   Number of Foam Pieces Placed 1 --       No Linked orders to display        4. Discharge Medications:  Current Discharge Medication List      START taking these medications    Details   doxycycline hyclate (VIBRAMYCIN) 100 MG capsule Take 1 capsule by mouth in the morning and 1 capsule in the evening. Do all this for 5 days.  Qty: 10 capsule, Refills: 0      benzonatate (TESSALON PERLES) 100 MG capsule Take 1 capsule by mouth 3 times daily as needed for Cough.  Qty: 30 capsule, Refills: 0      albuterol 108 (90 Base) MCG/ACT inhaler Inhale 2 puffs into the lungs every 6 hours as needed for Shortness of Breath or Wheezing.  Qty: 8 g, Refills: 0         CONTINUE these medications which have CHANGED    Details   metoPROLOL tartrate (LOPRESSOR) 50 MG tablet Take 1 tablet by mouth in the morning and 1 tablet in the evening.         CONTINUE these medications which have NOT CHANGED    Details   acetaminophen (TYLENOL) 500 MG tablet Take 500 mg by mouth 2 times daily as needed for Pain.      digoxin (LANOXIN) 0.125 MG tablet Take 1 tablet by mouth daily.  Qty: 90 tablet, Refills: 3    [facility]      furosemide (LASIX) 40 MG tablet Take  1 tablet by mouth 2 times daily.  Qty: 180 tablet, Refills: 3      cyclobenzaprine (FLEXERIL) 5 MG tablet Take 1 tablet by mouth 4 times daily as needed for muscle spasms  Qty: 20 tablet, Refills: 5      potassium chloride (KLOR-CON) 10 MEQ ER tablet Take 1 tablet by mouth daily.  Qty: 90 tablet, Refills: 3      gabapentin (NEURONTIN) 800 MG tablet Take 1 tablet by mouth 3 times daily.  Qty: 270 tablet, Refills: 0    Associated Diagnoses: Lumbar radiculopathy      aspirin 81 MG chewable tablet Chew 1 tablet by mouth daily. Do not start before June 8, 2021.  Qty:        atorvastatin (LIPITOR) 40 MG tablet Take 1 tablet by mouth nightly.  Qty:        dabigatran (PRADAXA) 150 MG Cap Take 1 capsule by mouth every 12 hours.  Qty: 60 capsule      cholecalciferol (VITAMIN D3) 1000 UNITS tablet Take 1,000 Units by mouth daily.      FLUoxetine (PROZAC) 10 MG capsule Take 10 mg by mouth daily.      ketamine 10 % cream Apply 1-2 pumps to painful area 3-4 times daily.  Qty: 240 g, Refills: 5               ALLERGIES:  Morphine and Trimethobenzamide hcl [tigan]     Follow-up:  No follow-up provider specified.  Future Appointments   Date Time Provider Department Center   6/1/2023  9:45 AM MD ORLIN Yanez   8/31/2023  9:30 AM MD ORLIN Yanez     Time spent on discharge was 35 minutes.  Discharge discussed with  Patient.    Signed:  Nicol Cornell MD  1/13/2023  9:58 AM   done

## 2023-01-30 ENCOUNTER — APPOINTMENT (OUTPATIENT)
Dept: ELECTROPHYSIOLOGY | Facility: CLINIC | Age: 73
End: 2023-01-30
Payer: MEDICARE

## 2023-01-30 VITALS
HEART RATE: 77 BPM | BODY MASS INDEX: 22.63 KG/M2 | SYSTOLIC BLOOD PRESSURE: 140 MMHG | RESPIRATION RATE: 16 BRPM | WEIGHT: 123 LBS | TEMPERATURE: 98 F | DIASTOLIC BLOOD PRESSURE: 80 MMHG | HEIGHT: 62 IN

## 2023-01-30 DIAGNOSIS — Z82.3 FAMILY HISTORY OF STROKE: ICD-10-CM

## 2023-01-30 DIAGNOSIS — Z82.49 FAMILY HISTORY OF ISCHEMIC HEART DISEASE AND OTHER DISEASES OF THE CIRCULATORY SYSTEM: ICD-10-CM

## 2023-01-30 PROCEDURE — 99214 OFFICE O/P EST MOD 30 MIN: CPT

## 2023-01-30 PROCEDURE — 93000 ELECTROCARDIOGRAM COMPLETE: CPT | Mod: 59

## 2023-01-30 PROCEDURE — 93228 REMOTE 30 DAY ECG REV/REPORT: CPT

## 2023-01-30 RX ORDER — SIMVASTATIN 10 MG/1
10 TABLET, FILM COATED ORAL
Refills: 0 | Status: COMPLETED | COMMUNITY
End: 2023-01-30

## 2023-01-30 RX ORDER — AMLODIPINE BESYLATE 2.5 MG/1
2.5 TABLET ORAL
Refills: 0 | Status: COMPLETED | COMMUNITY
End: 2023-01-30

## 2023-01-31 ENCOUNTER — APPOINTMENT (OUTPATIENT)
Age: 73
End: 2023-01-31
Payer: OTHER GOVERNMENT

## 2023-01-31 PROCEDURE — 99442: CPT | Mod: 95

## 2023-01-31 NOTE — REASON FOR VISIT
[Home] : at home, [unfilled] , at the time of the visit. [Medical Office: ([facility])___] : at the medical office located in  [Verbal consent obtained from patient] : the patient, [unfilled] [Follow-Up] : a follow-up visit [Sleep Apnea] : sleep apnea

## 2023-01-31 NOTE — DISCUSSION/SUMMARY
[FreeTextEntry1] : SOB ON EXERTION/ SCOLIOSIS/ EXTRATHORACIC RESTRICTION/ PFT NL\par LUNG NODULE STABLE\par SEVERE CORAL/ APAP\par

## 2023-02-05 LAB
BASOPHILS # BLD AUTO: 0.04 K/UL
BASOPHILS NFR BLD AUTO: 0.6 %
EOSINOPHIL # BLD AUTO: 0.09 K/UL
EOSINOPHIL NFR BLD AUTO: 1.3 %
HCT VFR BLD CALC: 34.5 %
HGB BLD-MCNC: 11.3 G/DL
IMM GRANULOCYTES NFR BLD AUTO: 0.3 %
LYMPHOCYTES # BLD AUTO: 1.36 K/UL
LYMPHOCYTES NFR BLD AUTO: 19.5 %
MAN DIFF?: NORMAL
MCHC RBC-ENTMCNC: 31.5 PG
MCHC RBC-ENTMCNC: 32.8 G/DL
MCV RBC AUTO: 96.1 FL
MONOCYTES # BLD AUTO: 0.57 K/UL
MONOCYTES NFR BLD AUTO: 8.2 %
NEUTROPHILS # BLD AUTO: 4.91 K/UL
NEUTROPHILS NFR BLD AUTO: 70.1 %
PLATELET # BLD AUTO: 230 K/UL
RBC # BLD: 3.59 M/UL
RBC # FLD: 13.6 %
WBC # FLD AUTO: 6.99 K/UL

## 2023-02-05 NOTE — DISCUSSION/SUMMARY
[FreeTextEntry1] : Ms. Alycia Son is a pleasant 72 year-old woman with hypertension, high cholesterol, severe CORAL, arthritis, scoliosis, and paroxysmal atrial fibrillation. Her CHADSVASC score is 3 (age, female, HTN).\par \par Patient is on Eliquis. She has no signs of bleeding. I recommend to continue Eliquis. \par \par I recommend follow-up with sleep specialist for initiation of CPAP.\par \par The management strategies for atrial fibrillation were discussed focusing on the issues of stroke prevention, heart rate control and rhythm control. The options of rate control, antiarrhythmic drug therapy, and electrophysiologic testing and catheter ablation therapy were discussed at length. \par \par I reviewed result of MCOT with patient at length. AF burden 1%, 64 episodes between 6 minutes and 29 minutes. No other arrhythmias. \par \par I recommend to increase Toprol 50 mg daily. I recommend initiation of CPAP. If patient continue to have symptoms or AF she will consider catheter ablation after CPAP and maximal medical therapy. \par \par I discussed with patient plan of care in great details. I answered all her questions to her satisfaction. Patient was pleased with the visit.\par \par Patient will follow with me in 6 months’ time. Please do not hesitate to contact me at [phone] if you have any further questions regarding this patient care.\par \par \par  [EKG obtained to assist in diagnosis and management of assessed problem(s)] : EKG obtained to assist in diagnosis and management of assessed problem(s)

## 2023-02-05 NOTE — REASON FOR VISIT
[Arrhythmia/ECG Abnorrmalities] : arrhythmia/ECG abnormalities [FreeTextEntry3] : Dr. Krishan Bagley - Dr. Ji Bowles\par

## 2023-02-05 NOTE — CARDIOLOGY SUMMARY
[de-identified] : (01/30/2023) ECG sinus rhythm at 77 bpm, no significant ST-T wave abnormalities, incomplete right bundle branch block, frequent APCs. \par  [de-identified] : (11/17/2022-12/16/2022) 30 days MCOT, AF burden 1%, 64 episodes between 6 minutes and 29 minutes. No other arrhythmias.  [de-identified] : (11/17/2022) 2D echo, EF 66%, mildly increased LV thickness. Normal LA size, normal RA size. Mild to moderate TR.

## 2023-02-05 NOTE — HISTORY OF PRESENT ILLNESS
[FreeTextEntry1] : hypertension, high cholesterol, arthritis, scoliosis, and paroxysmal atrial fibrillation\par She presented to the ED in Nov 2022 complaining of palpitations heart racing while prepping to have back injection at a pain management doctor's office. At baseline, patient uses a cane for ambulation. Patient was in her usual state of health today, she went to her pain management doctor's office for back injection for scoliosis. At the office patient was very nervous and is also under a lot of stress because of personal problems. Before the procedure begun, patient started having palpitations, at the office, patient was found to be in afib with HR 130s as per patient. On arrival to ED she was in normal rhythm\par \par Given MCOT\par \par She has no chest pain, no shortness of breath, no dyspnea on exertion, no orthopnea, no PND. She denies dizziness, lightheadedness and syncope. She has no exertional symptoms. She presents for evaluation.\par \par

## 2023-02-05 NOTE — ADDENDUM
[FreeTextEntry1] : Babs GARDNER assisted in documentation on 01/30/2023   acting as a scribe for Dr. Carol Kennedy.\par

## 2023-03-01 ENCOUNTER — APPOINTMENT (OUTPATIENT)
Dept: CARDIOLOGY | Facility: CLINIC | Age: 73
End: 2023-03-01
Payer: MEDICARE

## 2023-03-01 VITALS
WEIGHT: 121 LBS | SYSTOLIC BLOOD PRESSURE: 124 MMHG | DIASTOLIC BLOOD PRESSURE: 68 MMHG | BODY MASS INDEX: 22.26 KG/M2 | HEART RATE: 68 BPM | HEIGHT: 62 IN | OXYGEN SATURATION: 99 %

## 2023-03-01 PROCEDURE — 99214 OFFICE O/P EST MOD 30 MIN: CPT

## 2023-03-01 PROCEDURE — 93000 ELECTROCARDIOGRAM COMPLETE: CPT

## 2023-03-02 NOTE — DISCUSSION/SUMMARY
[EKG obtained to assist in diagnosis and management of assessed problem(s)] : EKG obtained to assist in diagnosis and management of assessed problem(s) [FreeTextEntry1] : PAF: The impression is paroxysmal atrial fibrillation. Currently, the condition is stable. There are no changes in medication management. Continue Metoprolol 50 mg. No further cardiac intervention warranted at the current time.\par \par HTN: The impression is hypertension. Currently, the condition is stable. There are no changes in medication management. Continue current medical therapy. Other planned treatments include an exercise regimen, weight loss, low sodium diet, and alcohol moderation.\par \par HLD: The impression is hyperlipidemia. Currently, the condition is stable. There are no changes in medication management. Continue current medical therapy. Other planned treatment includes diet modification, exercise, and weight loss.\par \par Instructed to follow up in 6 months. \par Plan was discussed with the patient.\par

## 2023-03-02 NOTE — HISTORY OF PRESENT ILLNESS
[FreeTextEntry1] : JODY EMERSON is a 72-year-old female, with a PMHx significant for HTN, HLD, arthritis, scoliosis, and PAF, who presents today for a follow-up visit. Patient recently had an episode of PAF and was admitted to the hospital after a pain procedure for spinal injection. She was found to have A-Fib and started on Eliquis. Patient was seen by Dr. Kennedy.  Denies any new complaints. Reports she is feeling well. Otherwise: (-) chest pain, (-) SOB.\par

## 2023-03-28 ENCOUNTER — APPOINTMENT (OUTPATIENT)
Dept: OBGYN | Facility: CLINIC | Age: 73
End: 2023-03-28
Payer: MEDICARE

## 2023-03-28 VITALS
DIASTOLIC BLOOD PRESSURE: 81 MMHG | TEMPERATURE: 98.6 F | SYSTOLIC BLOOD PRESSURE: 139 MMHG | WEIGHT: 120 LBS | HEIGHT: 62 IN | BODY MASS INDEX: 22.08 KG/M2 | HEART RATE: 80 BPM

## 2023-03-28 DIAGNOSIS — Z86.79 PERSONAL HISTORY OF OTHER DISEASES OF THE CIRCULATORY SYSTEM: ICD-10-CM

## 2023-03-28 DIAGNOSIS — N95.2 POSTMENOPAUSAL ATROPHIC VAGINITIS: ICD-10-CM

## 2023-03-28 DIAGNOSIS — Z82.49 FAMILY HISTORY OF ISCHEMIC HEART DISEASE AND OTHER DISEASES OF THE CIRCULATORY SYSTEM: ICD-10-CM

## 2023-03-28 DIAGNOSIS — M19.90 UNSPECIFIED OSTEOARTHRITIS, UNSPECIFIED SITE: ICD-10-CM

## 2023-03-28 DIAGNOSIS — R92.2 INCONCLUSIVE MAMMOGRAM: ICD-10-CM

## 2023-03-28 DIAGNOSIS — Z12.4 ENCOUNTER FOR SCREENING FOR MALIGNANT NEOPLASM OF CERVIX: ICD-10-CM

## 2023-03-28 DIAGNOSIS — Z86.39 PERSONAL HISTORY OF OTHER ENDOCRINE, NUTRITIONAL AND METABOLIC DISEASE: ICD-10-CM

## 2023-03-28 DIAGNOSIS — Z82.69 FAMILY HISTORY OF OTHER DISEASES OF THE MUSCULOSKELETAL SYSTEM AND CONNECTIVE TISSUE: ICD-10-CM

## 2023-03-28 DIAGNOSIS — Z86.69 PERSONAL HISTORY OF OTHER DISEASES OF THE NERVOUS SYSTEM AND SENSE ORGANS: ICD-10-CM

## 2023-03-28 DIAGNOSIS — Z80.8 FAMILY HISTORY OF MALIGNANT NEOPLASM OF OTHER ORGANS OR SYSTEMS: ICD-10-CM

## 2023-03-28 DIAGNOSIS — Z80.0 FAMILY HISTORY OF MALIGNANT NEOPLASM OF DIGESTIVE ORGANS: ICD-10-CM

## 2023-03-28 DIAGNOSIS — Z71.89 OTHER SPECIFIED COUNSELING: ICD-10-CM

## 2023-03-28 DIAGNOSIS — Z87.39 PERSONAL HISTORY OF OTHER DISEASES OF THE MUSCULOSKELETAL SYSTEM AND CONNECTIVE TISSUE: ICD-10-CM

## 2023-03-28 DIAGNOSIS — Z01.419 ENCOUNTER FOR GYNECOLOGICAL EXAMINATION (GENERAL) (ROUTINE) W/OUT ABNORMAL FINDINGS: ICD-10-CM

## 2023-03-28 DIAGNOSIS — Z87.42 PERSONAL HISTORY OF OTHER DISEASES OF THE FEMALE GENITAL TRACT: ICD-10-CM

## 2023-03-28 PROCEDURE — G0101: CPT

## 2023-04-08 LAB
CYTOLOGY CVX/VAG DOC THIN PREP: ABNORMAL
HPV HIGH+LOW RISK DNA PNL CVX: NOT DETECTED

## 2023-04-17 ENCOUNTER — APPOINTMENT (OUTPATIENT)
Dept: SURGERY | Facility: CLINIC | Age: 73
End: 2023-04-17
Payer: MEDICARE

## 2023-04-17 VITALS
BODY MASS INDEX: 22.63 KG/M2 | SYSTOLIC BLOOD PRESSURE: 140 MMHG | HEART RATE: 90 BPM | HEIGHT: 62 IN | DIASTOLIC BLOOD PRESSURE: 82 MMHG | OXYGEN SATURATION: 97 % | WEIGHT: 123 LBS | TEMPERATURE: 97 F

## 2023-04-17 DIAGNOSIS — M41.20 OTHER IDIOPATHIC SCOLIOSIS, SITE UNSPECIFIED: ICD-10-CM

## 2023-04-17 DIAGNOSIS — G47.30 SLEEP APNEA, UNSPECIFIED: ICD-10-CM

## 2023-04-17 PROCEDURE — 99203 OFFICE O/P NEW LOW 30 MIN: CPT

## 2023-04-18 PROBLEM — G47.30 SLEEP APNEA IN ADULT: Status: ACTIVE | Noted: 2023-04-18

## 2023-04-18 PROBLEM — M41.20 SCOLIOSIS (AND KYPHOSCOLIOSIS), IDIOPATHIC: Status: ACTIVE | Noted: 2022-05-04

## 2023-04-18 NOTE — REASON FOR VISIT
[Initial Evaluation] : an initial evaluation [Spouse] : spouse [FreeTextEntry1] : Left inguinal hernia

## 2023-04-18 NOTE — HISTORY OF PRESENT ILLNESS
[de-identified] :  the patient comes with her  to be evaluated for repair of a left inguinal hernia.  She has known about this hernia for the past 2 years and although it is asymptomatic, it is enlarging slightly.  Its onset was not associated with any specific episode of heavy lifting or straining.  Her last colonoscopy was about 2 years ago.  She denies any recent weight loss or change in the bowel or bladder habit.  The  hernia was confirmed on a CT scan done about 2 years ago.

## 2023-04-18 NOTE — ASSESSMENT
[FreeTextEntry1] :   Left inguinal hernia, for which elective repair with mesh is advised, so as to try to avoid the possible complications of an untreated hernia, which were explained.   the procedure was also explained in detail with his risks and benefits and they understand and agree.  The patient states that her cardiologist prefers that she have a stress test prior to surgery and she will contact him to arrange this promptly.  They will then contact the office for scheduling once cleared from that standpoint.  Appropriate precautions and warning signs were advised for the interim and a surgical consent was obtained at this office visit.  They understand that their discharge from PACU may be delayed in light of her underlying sleep apnea and that her anticoagulation will need to be discontinued at least 2 days before surgery.  They are happy with the assessment and plan.

## 2023-04-18 NOTE — PHYSICAL EXAM
[Normal Thyroid] : the thyroid was normal [Normal Breath Sounds] : Normal breath sounds [Normal Heart Sounds] : normal heart sounds [Normal Rate and Rhythm] : normal rate and rhythm [Abdominal Masses] : No abdominal masses [Abdomen Tenderness] : ~T ~M No abdominal tenderness [No HSM] : no hepatosplenomegaly [de-identified] :  healthy,  visible scoliosis [de-identified] :  no adenopathy [de-identified] :  soft and not distended.  Both supine and standing, with coughing and straining, there is a left inguinal hernia which is freely reducible and there are no local acute changes.  No right inguinal hernia or femoral hernias are noted.  No inguinal or femoral lymphadenopathy.  No other abdominal hernias noted.

## 2023-05-09 ENCOUNTER — NON-APPOINTMENT (OUTPATIENT)
Age: 73
End: 2023-05-09

## 2023-05-16 ENCOUNTER — NON-APPOINTMENT (OUTPATIENT)
Age: 73
End: 2023-05-16

## 2023-05-22 ENCOUNTER — OUTPATIENT (OUTPATIENT)
Dept: OUTPATIENT SERVICES | Facility: HOSPITAL | Age: 73
LOS: 1 days | End: 2023-05-22
Payer: MEDICARE

## 2023-05-22 VITALS
DIASTOLIC BLOOD PRESSURE: 67 MMHG | RESPIRATION RATE: 16 BRPM | HEIGHT: 61 IN | OXYGEN SATURATION: 100 % | SYSTOLIC BLOOD PRESSURE: 139 MMHG | TEMPERATURE: 97 F | WEIGHT: 119.93 LBS | HEART RATE: 76 BPM

## 2023-05-22 DIAGNOSIS — Z98.890 OTHER SPECIFIED POSTPROCEDURAL STATES: Chronic | ICD-10-CM

## 2023-05-22 DIAGNOSIS — Z01.818 ENCOUNTER FOR OTHER PREPROCEDURAL EXAMINATION: ICD-10-CM

## 2023-05-22 DIAGNOSIS — K40.90 UNILATERAL INGUINAL HERNIA, WITHOUT OBSTRUCTION OR GANGRENE, NOT SPECIFIED AS RECURRENT: ICD-10-CM

## 2023-05-22 LAB
ALBUMIN SERPL ELPH-MCNC: 4.6 G/DL — SIGNIFICANT CHANGE UP (ref 3.5–5.2)
ALP SERPL-CCNC: 102 U/L — SIGNIFICANT CHANGE UP (ref 30–115)
ALT FLD-CCNC: 25 U/L — SIGNIFICANT CHANGE UP (ref 0–41)
ANION GAP SERPL CALC-SCNC: 10 MMOL/L — SIGNIFICANT CHANGE UP (ref 7–14)
APPEARANCE UR: CLEAR — SIGNIFICANT CHANGE UP
APTT BLD: 34.9 SEC — SIGNIFICANT CHANGE UP (ref 27–39.2)
AST SERPL-CCNC: 26 U/L — SIGNIFICANT CHANGE UP (ref 0–41)
BASOPHILS # BLD AUTO: 0.05 K/UL — SIGNIFICANT CHANGE UP (ref 0–0.2)
BASOPHILS NFR BLD AUTO: 0.8 % — SIGNIFICANT CHANGE UP (ref 0–1)
BILIRUB SERPL-MCNC: 0.4 MG/DL — SIGNIFICANT CHANGE UP (ref 0.2–1.2)
BILIRUB UR-MCNC: NEGATIVE — SIGNIFICANT CHANGE UP
BUN SERPL-MCNC: 23 MG/DL — HIGH (ref 10–20)
CALCIUM SERPL-MCNC: 10.1 MG/DL — SIGNIFICANT CHANGE UP (ref 8.4–10.5)
CHLORIDE SERPL-SCNC: 95 MMOL/L — LOW (ref 98–110)
CO2 SERPL-SCNC: 32 MMOL/L — SIGNIFICANT CHANGE UP (ref 17–32)
COLOR SPEC: YELLOW — SIGNIFICANT CHANGE UP
CREAT SERPL-MCNC: 0.7 MG/DL — SIGNIFICANT CHANGE UP (ref 0.7–1.5)
DIFF PNL FLD: NEGATIVE — SIGNIFICANT CHANGE UP
EGFR: 91 ML/MIN/1.73M2 — SIGNIFICANT CHANGE UP
EOSINOPHIL # BLD AUTO: 0.08 K/UL — SIGNIFICANT CHANGE UP (ref 0–0.7)
EOSINOPHIL NFR BLD AUTO: 1.3 % — SIGNIFICANT CHANGE UP (ref 0–8)
GLUCOSE SERPL-MCNC: 86 MG/DL — SIGNIFICANT CHANGE UP (ref 70–99)
GLUCOSE UR QL: NEGATIVE — SIGNIFICANT CHANGE UP
HCT VFR BLD CALC: 32.1 % — LOW (ref 37–47)
HGB BLD-MCNC: 10.5 G/DL — LOW (ref 12–16)
IMM GRANULOCYTES NFR BLD AUTO: 0.2 % — SIGNIFICANT CHANGE UP (ref 0.1–0.3)
INR BLD: 1.23 RATIO — SIGNIFICANT CHANGE UP (ref 0.65–1.3)
KETONES UR-MCNC: NEGATIVE — SIGNIFICANT CHANGE UP
LEUKOCYTE ESTERASE UR-ACNC: NEGATIVE — SIGNIFICANT CHANGE UP
LYMPHOCYTES # BLD AUTO: 1.3 K/UL — SIGNIFICANT CHANGE UP (ref 1.2–3.4)
LYMPHOCYTES # BLD AUTO: 20.6 % — SIGNIFICANT CHANGE UP (ref 20.5–51.1)
MCHC RBC-ENTMCNC: 29.5 PG — SIGNIFICANT CHANGE UP (ref 27–31)
MCHC RBC-ENTMCNC: 32.7 G/DL — SIGNIFICANT CHANGE UP (ref 32–37)
MCV RBC AUTO: 90.2 FL — SIGNIFICANT CHANGE UP (ref 81–99)
MONOCYTES # BLD AUTO: 0.47 K/UL — SIGNIFICANT CHANGE UP (ref 0.1–0.6)
MONOCYTES NFR BLD AUTO: 7.5 % — SIGNIFICANT CHANGE UP (ref 1.7–9.3)
NEUTROPHILS # BLD AUTO: 4.39 K/UL — SIGNIFICANT CHANGE UP (ref 1.4–6.5)
NEUTROPHILS NFR BLD AUTO: 69.6 % — SIGNIFICANT CHANGE UP (ref 42.2–75.2)
NITRITE UR-MCNC: NEGATIVE — SIGNIFICANT CHANGE UP
NRBC # BLD: 0 /100 WBCS — SIGNIFICANT CHANGE UP (ref 0–0)
PH UR: 7.5 — SIGNIFICANT CHANGE UP (ref 5–8)
PLATELET # BLD AUTO: 316 K/UL — SIGNIFICANT CHANGE UP (ref 130–400)
PMV BLD: 12.7 FL — HIGH (ref 7.4–10.4)
POTASSIUM SERPL-MCNC: 3.6 MMOL/L — SIGNIFICANT CHANGE UP (ref 3.5–5)
POTASSIUM SERPL-SCNC: 3.6 MMOL/L — SIGNIFICANT CHANGE UP (ref 3.5–5)
PROT SERPL-MCNC: 7.4 G/DL — SIGNIFICANT CHANGE UP (ref 6–8)
PROT UR-MCNC: SIGNIFICANT CHANGE UP
PROTHROM AB SERPL-ACNC: 14.1 SEC — HIGH (ref 9.95–12.87)
RBC # BLD: 3.56 M/UL — LOW (ref 4.2–5.4)
RBC # FLD: 13.3 % — SIGNIFICANT CHANGE UP (ref 11.5–14.5)
SODIUM SERPL-SCNC: 137 MMOL/L — SIGNIFICANT CHANGE UP (ref 135–146)
SP GR SPEC: 1.02 — SIGNIFICANT CHANGE UP (ref 1.01–1.03)
UROBILINOGEN FLD QL: SIGNIFICANT CHANGE UP
WBC # BLD: 6.3 K/UL — SIGNIFICANT CHANGE UP (ref 4.8–10.8)
WBC # FLD AUTO: 6.3 K/UL — SIGNIFICANT CHANGE UP (ref 4.8–10.8)

## 2023-05-22 PROCEDURE — 85610 PROTHROMBIN TIME: CPT

## 2023-05-22 PROCEDURE — 85025 COMPLETE CBC W/AUTO DIFF WBC: CPT

## 2023-05-22 PROCEDURE — 93005 ELECTROCARDIOGRAM TRACING: CPT

## 2023-05-22 PROCEDURE — 81003 URINALYSIS AUTO W/O SCOPE: CPT

## 2023-05-22 PROCEDURE — 80053 COMPREHEN METABOLIC PANEL: CPT

## 2023-05-22 PROCEDURE — 93010 ELECTROCARDIOGRAM REPORT: CPT

## 2023-05-22 PROCEDURE — 36415 COLL VENOUS BLD VENIPUNCTURE: CPT

## 2023-05-22 PROCEDURE — 85730 THROMBOPLASTIN TIME PARTIAL: CPT

## 2023-05-22 PROCEDURE — 99214 OFFICE O/P EST MOD 30 MIN: CPT | Mod: 25

## 2023-05-22 NOTE — H&P PST ADULT - REASON FOR ADMISSION
Suite: [facility]  Proceduralist: Murphy Lee  Confirmed Surgery DateTime: 06- - 0:00  PAST DateTime: 05- - 14:30  Procedure: LEFT INGUINAL HERNIA REPAIR WITH MESH  Laterality: Left  Length of Procedure: 45 Minutes  Anesthesia Type: General

## 2023-05-22 NOTE — H&P PST ADULT - NSICDXPASTMEDICALHX_GEN_ALL_CORE_FT
PAST MEDICAL HISTORY:  Atrial fibrillation     Autonomous thyroid nodule     H/O scoliosis     HTN (hypertension)     Hypercholesterolemia     Lung nodule     OA (osteoarthritis)     CORAL on CPAP     Psoriasis     Stress incontinence, female

## 2023-05-22 NOTE — H&P PST ADULT - HISTORY OF PRESENT ILLNESS
PT REPORTS LEFT INGUINAL HERNIA X APPROX TWO YEARS.   REPORTS DISCOMFORM  CANNOT DESCRIBE  THE DISCOMFORT  PAIN IN LEFT INGUINAL REGION   NO RADIATION  3/10   DOES NOT NEED PAIN MED   PAIN INCREASES WHEN MOVING BOWELS.    PATIENT CURRENTLY DENIES CHEST PAIN    PALPITATIONS,  DYSURIA, OR URI WITHIN THE IN PAST 2 WEEKS  EXERCISE  TOLERANCE  CAN WALK TWO BACKS AND UP TWO 2 FLIGHT OF STAIRS SLOWLY  WITHOUT SHORTNESS OF BREATH  -Denies travel outside the [place] in the past 30 days  -Patient denies any signs or symptoms of COVID 19 and denies contact with known positive individuals.    -Patient was instructed to quarantine pre-procedure, practice exposure control measures, continue to self-monitor and report any concerns to their proceduralist.  -Pt advised self quarantine till day of procedure    ANESTHESIA ALERT  NO--Difficult Airway  NO--History of neck surgery or radiation  NO--Limited ROM of neck  NO--History of Malignant hyperthermia  NO--No personal or family history of Pseudocholinesterase deficiency.  NO--Prior Anesthesia Complication  NO--Latex Allergy  NO--Loose teeth  NO--History of Rheumatoid Arthritis  NO--Bleeding risk  YES--CORAL  BRING CPAP DOP  NO--Other_____    -PT DENIES ANY RASHES, ABRASION, OR OPEN WOUNDS   -AS PER THE PT, THIS IS HIS/HER COMPLETE MEDICAL AND SURGICAL HX, INCLUDING MEDICATIONS PRESCRIBED AND OVER THE COUNTER    Patient verbalized understanding of instructions and was given the opportunity to ask questions and have them answered.    Pt denies any suicidal ideation or thoughts.  Pt states not a threat to self or others.  DENIES DOMESTIC VIOLENCE

## 2023-05-23 DIAGNOSIS — Z01.818 ENCOUNTER FOR OTHER PREPROCEDURAL EXAMINATION: ICD-10-CM

## 2023-05-23 DIAGNOSIS — K40.90 UNILATERAL INGUINAL HERNIA, WITHOUT OBSTRUCTION OR GANGRENE, NOT SPECIFIED AS RECURRENT: ICD-10-CM

## 2023-05-30 ENCOUNTER — APPOINTMENT (OUTPATIENT)
Dept: PULMONOLOGY | Facility: CLINIC | Age: 73
End: 2023-05-30
Payer: MEDICARE

## 2023-05-30 VITALS
SYSTOLIC BLOOD PRESSURE: 120 MMHG | HEIGHT: 62 IN | RESPIRATION RATE: 14 BRPM | HEART RATE: 87 BPM | OXYGEN SATURATION: 98 % | BODY MASS INDEX: 22.26 KG/M2 | WEIGHT: 121 LBS | DIASTOLIC BLOOD PRESSURE: 80 MMHG

## 2023-05-30 PROBLEM — G47.33 OBSTRUCTIVE SLEEP APNEA (ADULT) (PEDIATRIC): Chronic | Status: ACTIVE | Noted: 2023-05-22

## 2023-05-30 PROBLEM — E78.00 PURE HYPERCHOLESTEROLEMIA, UNSPECIFIED: Chronic | Status: ACTIVE | Noted: 2023-05-22

## 2023-05-30 PROBLEM — N39.3 STRESS INCONTINENCE (FEMALE) (MALE): Chronic | Status: ACTIVE | Noted: 2023-05-22

## 2023-05-30 PROBLEM — I48.91 UNSPECIFIED ATRIAL FIBRILLATION: Chronic | Status: ACTIVE | Noted: 2023-05-22

## 2023-05-30 PROBLEM — Z87.39 PERSONAL HISTORY OF OTHER DISEASES OF THE MUSCULOSKELETAL SYSTEM AND CONNECTIVE TISSUE: Chronic | Status: ACTIVE | Noted: 2023-05-22

## 2023-05-30 PROBLEM — L40.9 PSORIASIS, UNSPECIFIED: Chronic | Status: ACTIVE | Noted: 2023-05-22

## 2023-05-30 PROCEDURE — 99213 OFFICE O/P EST LOW 20 MIN: CPT

## 2023-05-30 NOTE — DISCUSSION/SUMMARY
[FreeTextEntry1] : SOB ON EXERTION/ SCOLIOSIS/ EXTRATHORACIC RESTRICTION/ PFT NL\par SEVERE CORAL COMPLIANT AND BENEFITING\par \par

## 2023-06-02 ENCOUNTER — OUTPATIENT (OUTPATIENT)
Dept: INPATIENT UNIT | Facility: HOSPITAL | Age: 73
LOS: 1 days | Discharge: HOME | End: 2023-06-02
Payer: MEDICARE

## 2023-06-02 ENCOUNTER — TRANSCRIPTION ENCOUNTER (OUTPATIENT)
Age: 73
End: 2023-06-02

## 2023-06-02 ENCOUNTER — APPOINTMENT (OUTPATIENT)
Dept: SURGERY | Facility: CLINIC | Age: 73
End: 2023-06-02

## 2023-06-02 VITALS
HEIGHT: 61 IN | OXYGEN SATURATION: 97 % | DIASTOLIC BLOOD PRESSURE: 71 MMHG | RESPIRATION RATE: 18 BRPM | SYSTOLIC BLOOD PRESSURE: 157 MMHG | WEIGHT: 121.03 LBS | HEART RATE: 83 BPM | TEMPERATURE: 97 F

## 2023-06-02 VITALS — RESPIRATION RATE: 16 BRPM | SYSTOLIC BLOOD PRESSURE: 119 MMHG | HEART RATE: 66 BPM | DIASTOLIC BLOOD PRESSURE: 50 MMHG

## 2023-06-02 DIAGNOSIS — K40.90 UNILATERAL INGUINAL HERNIA, WITHOUT OBSTRUCTION OR GANGRENE, NOT SPECIFIED AS RECURRENT: ICD-10-CM

## 2023-06-02 DIAGNOSIS — Z98.890 OTHER SPECIFIED POSTPROCEDURAL STATES: Chronic | ICD-10-CM

## 2023-06-02 PROCEDURE — 12032 INTMD RPR S/A/T/EXT 2.6-7.5: CPT | Mod: 59

## 2023-06-02 PROCEDURE — C1889: CPT

## 2023-06-02 PROCEDURE — C1781: CPT

## 2023-06-02 PROCEDURE — 49505 PRP I/HERN INIT REDUC >5 YR: CPT | Mod: LT

## 2023-06-02 RX ORDER — CHLORTHALIDONE 50 MG
1 TABLET ORAL
Qty: 0 | Refills: 0 | DISCHARGE

## 2023-06-02 RX ORDER — METOPROLOL TARTRATE 50 MG
1 TABLET ORAL
Qty: 0 | Refills: 0 | DISCHARGE

## 2023-06-02 RX ORDER — LOSARTAN POTASSIUM 100 MG/1
1 TABLET, FILM COATED ORAL
Qty: 0 | Refills: 0 | DISCHARGE

## 2023-06-02 RX ORDER — ACETAMINOPHEN WITH CODEINE 300MG-30MG
1 TABLET ORAL
Qty: 18 | Refills: 0
Start: 2023-06-02 | End: 2023-06-04

## 2023-06-02 RX ORDER — SODIUM CHLORIDE 9 MG/ML
1000 INJECTION, SOLUTION INTRAVENOUS
Refills: 0 | Status: DISCONTINUED | OUTPATIENT
Start: 2023-06-02 | End: 2023-06-02

## 2023-06-02 RX ORDER — HYDROMORPHONE HYDROCHLORIDE 2 MG/ML
0.5 INJECTION INTRAMUSCULAR; INTRAVENOUS; SUBCUTANEOUS
Refills: 0 | Status: DISCONTINUED | OUTPATIENT
Start: 2023-06-02 | End: 2023-06-02

## 2023-06-02 RX ORDER — ONDANSETRON 8 MG/1
4 TABLET, FILM COATED ORAL ONCE
Refills: 0 | Status: DISCONTINUED | OUTPATIENT
Start: 2023-06-02 | End: 2023-06-02

## 2023-06-02 RX ORDER — APIXABAN 2.5 MG/1
1 TABLET, FILM COATED ORAL
Refills: 0 | DISCHARGE

## 2023-06-02 NOTE — ASU DISCHARGE PLAN (ADULT/PEDIATRIC) - CALL YOUR DOCTOR IF YOU HAVE ANY OF THE FOLLOWING:
Bleeding that does not stop/Swelling that gets worse/Pain not relieved by Medications/Wound/Surgical Site with redness, or foul smelling discharge or pus/Numbness, tingling, color or temperature change to extremity/Unable to urinate/Inability to tolerate liquids or foods

## 2023-06-02 NOTE — ASU PATIENT PROFILE, ADULT - FALL HARM RISK - RISK INTERVENTIONS

## 2023-06-02 NOTE — PACU DISCHARGE NOTE - AIRWAY PATENCY:
Pharmacy Consult-Vancomycin Dosing  Neema Richard is a  78 y.o. female receiving vancomycin therapy.     Indication: PNA  Consulting Provider: hospitalist  ID Consult:     Goal Trough:15-20    Current Antimicrobial Therapy  Anti-Infectives (From admission, onward)      Ordered     Dose/Rate Route Frequency Start Stop    03/20/20 2252  vancomycin 1000 mg/250 mL 0.9% NS IVPB ([facility])     Ordering Provider:  Alan Raygoza, RPH    15 mg/kg × 61.2 kg  over 60 Minutes Intravenous Every 24 Hours 03/21/20 2100 03/28/20 2059 03/20/20 2250  doxycycline (VIBRAMYCIN) 100 mg/100 mL 0.9% NS MBP     Ordering Provider:  Kirill Grant MD    100 mg  over 60 Minutes Intravenous Every 12 Hours 03/20/20 2345 03/25/20 2344 03/20/20 2252  vancomycin 1250 mg/250 mL 0.9% NS IVPB ([facility])     Ordering Provider:  Alan Raygoza, RPH    20 mg/kg × 61.2 kg  over 90 Minutes Intravenous Once 03/20/20 2345 03/16/20 0811  ertapenem (INVanz) 1 g/100 mL 0.9% NS VTB ([facility])     Joni Alfaro [facility] reviewed the order on 03/16/20 1033.   Ordering Provider:  Sravan Vazquez MD    1 g  over 30 Minutes Intravenous Every 24 Hours 03/16/20 0900 03/23/20 0859    03/15/20 1954  meropenem (MERREM) 1 g/100 mL 0.9% NS VTB ([facility]p)     Ordering Provider:  Darshan Steward MD    1 g  over 30 Minutes Intravenous Once 03/15/20 1955 03/15/20 2122            Allergies  Allergies as of 03/15/2020 - Reviewed 03/15/2020   Allergen Reaction Noted    Evista [raloxifene] Rash 05/24/2017    Cephalexin Diarrhea     Hydrochlorothiazide Diarrhea     Meloxicam Other (See Comments)     Metronidazole Diarrhea 04/11/2017    Oxybutynin Cough     Propylthiouracil Other (See Comments) 04/11/2017    Sulfa antibiotics Hives 08/25/2016    Sulfacetamide sodium-sulfur Hives     Sulfur dioxide Hives        Labs    Results from last 7 days   Lab Units 03/19/20  0515 03/18/20  0539 03/17/20  0451   BUN mg/dL 41* 30* 28*   CREATININE mg/dL 0.61 0.73 0.68       Results from last 7 days   Lab  Satisfactory "Units 03/20/20  0604 03/19/20  0515 03/18/20  0444   WBC 10*3/mm3 17.68* 14.82* 11.97*       Evaluation of Dosing     Last Dose Received in the ED/Outside Facility: none  Is Patient on Dialysis or Renal Replacement: no    Ht - 170.2 cm (67\")  Wt - 61.2 kg (135 lb)    Estimated Creatinine Clearance: 56 mL/min (by C-G formula based on SCr of 0.61 mg/dL).    Intake & Output (last 3 days)         03/18 0701 - 03/19 0700 03/19 0701 - 03/20 0700 03/20 0701 - 03/21 0700    P.O.  120     I.V. (mL/kg)  1621.3 (26.5) 842.6 (13.8)    IV Piggyback   100    Total Intake(mL/kg)  1741.3 (28.5) 942.6 (15.4)    Urine (mL/kg/hr) 550 (0.4) 800 (0.5) 1000 (1)    Drains 60 100 80    Stool   0    Total Output     Net -610 +841.3 -137.4           Urine Unmeasured Occurrence 251 x 4 x             Microbiology and Radiology  Microbiology Results (last 10 days)       Procedure Component Value - Date/Time    Anaerobic Culture - Swab, Peritoneum [[id number]] Collected:  03/17/20 1559    Lab Status:  Preliminary result Specimen:  Swab from Peritoneum Updated:  03/20/20 0709     Anaerobic Culture No anaerobes isolated at 3 days    Wound Culture - Swab, Peritoneum [[id number]] Collected:  03/17/20 1559    Lab Status:  Final result Specimen:  Swab from Peritoneum Updated:  03/20/20 0805     Wound Culture Light growth (2+) Normal Fecal Syeda     Gram Stain Many (4+) Red blood cells      Moderate (3+) WBCs seen      No organisms seen    AFB Culture - Swab, Peritoneum [[id number]] Collected:  03/17/20 1559    Lab Status:  Preliminary result Specimen:  Swab from Peritoneum Updated:  03/18/20 1240     AFB Stain No acid fast bacilli seen on concentrated smear    Blood Culture - Blood, Arm, Right [[id number]] Collected:  03/16/20 0018    Lab Status:  Preliminary result Specimen:  Blood from Arm, Right Updated:  03/20/20 0515     Blood Culture No growth at 4 days    Blood Culture - Blood, Arm, Left [[id number]] Collected:  03/16/20 0018    Lab " Status:  Preliminary result Specimen:  Blood from Arm, Left Updated:  03/20/20 0515     Blood Culture No growth at 4 days            Evaluation of Level                         Assessment/Plan:    The patient will be started on a bolus of 20mg/kg for a dose of 1250mg . Will initiate maintenance dose at 1000 mg IV every 24 hours.  Plan for trough as patient approaches steady state, prior to the 3rd dose.  Due to infection severity, will target a trough of 15-20.    Pharmacy will continue to follow the patient’s culture results and clinical progress daily.    Alan Raygoza, AshwinD

## 2023-06-02 NOTE — ASU DISCHARGE PLAN (ADULT/PEDIATRIC) - NS MD DC FALL RISK RISK
For information on Fall & Injury Prevention, visit: [email or web address] OR  [email or web address] OR  https://www.cdc.gov/steadi/patient.html

## 2023-06-02 NOTE — BRIEF OPERATIVE NOTE - CONDITION POST OP
Returned pt's call.  Pt was prescribed Tinzanidine for neck pain and spasms 6/5/19.  She took one dose Wednesday night.  Thursday, she woke up very nauseated.  She has not taken any more of the medication.   Nausea off and on since then, no vomiting.  She is able to eat and keep down fluids.  Denies migraines.  LMP 5/31, flow was very light, which is not normal for her.  Pt is sexually active, without contraception.  Discussed with pt that nausea is unlikely related to Tizanidine.  Recommend pt come in for pregnancy test.  Pt agreeable to plan.     stable

## 2023-06-02 NOTE — PRE-ANESTHESIA EVALUATION ADULT - NSANTHDIETYNSD_GEN_ALL_CORE
[facility] Inpatient Hematology / Oncology Daily Progress Note Reason for Admission:  Admission for antineoplastic chemotherapy [Z51.11] 24 Hour Events: 
Afebrile, VSS Day 10 FLAG-VENITA Fatigued Diarrhea improved Nausea ROS: 
Constitutional: +fatigue Negative for fever, chills, weakness, malaise. CV: Negative for chest pain, palpitations, edema. Respiratory: Negative for dyspnea, cough, wheezing. GI: +abd pain-much improved, loose stools, nausea. 10 point review of systems is otherwise negative with the exception of the elements mentioned above in the HPI. No Known Allergies Past Medical History:  
Diagnosis Date  AML (acute myeloid leukemia) ([facility] [address] dx- 4/2019  
 followed by dr Israel Goncalves  Depression  Hypercholesterolemia  Infection   
 of port -- was placed 5/2019-- removed 6/2019---right chest  
 Psychiatric disorder   
 aniexty  Sleep apnea Past Surgical History:  
Procedure Laterality Date  HX OTHER SURGICAL    
 colonoscopy  HX VASCULAR ACCESS    
 IR INSERT TUNL CVC W PORT OVER 5 YEARS  4/30/2019  IR INSERT TUNL CVC W PORT OVER 5 YEARS  7/15/2019  IR REMOVE TUNL CVAD W PORT/PUMP  6/13/2019 Family History Problem Relation Age of Onset  Cancer Father Social History Socioeconomic History  Marital status:  Spouse name: Not on file  Number of children: Not on file  Years of education: Not on file  Highest education level: Not on file Occupational History  Not on file Social Needs  Financial resource strain: Not on file  Food insecurity:  
  Worry: Not on file Inability: Not on file  Transportation needs:  
  Medical: Not on file Non-medical: Not on file Tobacco Use  Smoking status: Never Smoker  Smokeless tobacco: Never Used Substance and Sexual Activity  Alcohol use: Never Frequency: Never  Drug use: Never  Sexual activity: Not on file Lifestyle  Physical activity:  
  Days per week: Not on file Minutes per session: Not on file  Stress: Not on file Relationships  Social connections:  
  Talks on phone: Not on file Gets together: Not on file Attends [employer] service: Not on file Active member of club or organization: Not on file Attends meetings of clubs or organizations: Not on file Relationship status: Not on file  Intimate partner violence:  
  Fear of current or ex partner: Not on file Emotionally abused: Not on file Physically abused: Not on file Forced sexual activity: Not on file Other Topics Concern [address] Service Not Asked  Blood Transfusions Not Asked  Caffeine Concern Not Asked  Occupational Exposure Not Asked Julia Mulch Hazards Not Asked  Sleep Concern Not Asked  Stress Concern Not Asked  Weight Concern Not Asked  Special Diet Not Asked  Back Care Not Asked  Exercise Not Asked  Bike Helmet Not Asked  Seat Belt Not Asked  Self-Exams Not Asked Social History Narrative  Not on file Current Facility-Administered Medications Medication Dose Route Frequency Provider Last Rate Last Dose  
 0.9% sodium chloride infusion 250 mL  250 mL IntraVENous PROMISE Crow MD      
 0.9% sodium chloride infusion 250 mL  250 mL IntraVENous PRN Sarah Crow MD      
 loperamide (IMODIUM) capsule 2 mg  2 mg Oral Q4H PRN Sarah Crow MD   2 mg at 11/16/19 1023  
 0.9% sodium chloride infusion 250 mL  250 mL IntraVENous PRN Sarah Crow MD      
 loratadine (CLARITIN) tablet 10 mg  10 mg Oral DAILY Ethelene Blush, NP   10 mg at 11/16/19 0810  
 sodium chloride 0.9 % bolus infusion 1,000 mL  1,000 mL IntraVENous QHS Ethelene Blush, NP   1,000 mL at 11/16/19 2200  central line flush (saline) syringe 10 mL  10 mL InterCATHeter PRADALBERTO Crow MD   10 mL at 11/12/19 0031  docusate sodium (COLACE) capsule 100 mg  100 mg Oral BID PRN Myah Sanes, NP   100 mg at 11/11/19 1308  potassium, sodium phosphates (NEUTRA-PHOS) packet 1 Packet  1 Packet Oral TID Kerry Minor MD   1 Packet at 11/17/19 1010  
 magnesium oxide (MAG-OX) tablet 400 mg  400 mg Oral BID Myah Sanes, NP   400 mg at 11/16/19 2349  dexamethasone (PF) (DECADRON) injection 10 mg  10 mg IntraVENous Q24H Kerry Minor MD   10 mg at 11/16/19 1023  LORazepam (ATIVAN) injection 0.5 mg  0.5 mg IntraVENous Q6H PRN Kerry Minor MD   0.5 mg at 11/17/19 5298  prednisoLONE acetate (PRED FORTE) 1 % ophthalmic suspension 2 Drop  2 Drop Both Eyes Q6H Kerry Minor MD   2 Drop at 11/17/19 0500  
 saline peripheral flush soln 10 mL  10 mL InterCATHeter PRN Kerry Minor MD   10 mL at 11/13/19 2154  heparin (porcine) pf 300-500 Units  300-500 Units InterCATHeter PRN Kerry Minor MD      
 tbo-filgrastim [facility]) injection 480 mcg  480 mcg SubCUTAneous QHS Kerry Minor MD   480 mcg at 11/16/19 2200  
 acyclovir (ZOVIRAX) tablet 400 mg  400 mg Oral BID Myah Sanes, NP   400 mg at 11/17/19 1011  
 allopurinol (ZYLOPRIM) tablet 300 mg  300 mg Oral DAILY Myah Sanes, NP   300 mg at 11/17/19 1011  cholecalciferol (VITAMIN D3) (400 Units /10 mcg) tablet 2 Tab  800 Units Oral DAILY Myah Sanes, NP   2 Tab at 11/16/19 4777  DULoxetine (CYMBALTA) capsule 30 mg  30 mg Oral DAILY Myah Sanes, NP   30 mg at 11/17/19 1011  lidocaine-prilocaine (EMLA) 2.5-2.5 % cream   Topical PRN Myah Sanes, NP      
 LORazepam (ATIVAN) tablet 1 mg  1 mg Oral QHS Myah Sanes, NP   1 mg at 11/16/19 2346  pravastatin (PRAVACHOL) tablet 10 mg  10 mg Oral QHS Myah Sanes, NP   10 mg at 11/16/19 2348  voriconazole (VFEND) tablet 200 mg  200 mg Oral Q12H Myah Sanes, NP   200 mg at 11/17/19 1011  
 ondansetron (ZOFRAN) injection 4 mg  4 mg IntraVENous Q4H PRN Myah Sanes, NP   4 mg at 11/15/19 4703  prochlorperazine (COMPAZINE) with saline injection 5 mg  5 mg IntraVENous Q6H PRN Darby Nunez NP      
 HYDROcodone-acetaminophen [facility]) 5-325 mg per tablet 1 Tab  1 Tab Oral Q6H PRN Darby Nunez NP   1 Tab at 19 5620  morphine injection 2 mg  2 mg IntraVENous Q4H PRN Darby Nunez NP      
 acetaminophen (TYLENOL) tablet 650 mg  650 mg Oral Q6H PRN Lily Lopez MD   650 mg at 19 1013  diphenhydrAMINE (BENADRYL) capsule 25 mg  25 mg Oral Q6H PRN Lily Lopez MD   25 mg at 19 2348  levoFLOXacin (LEVAQUIN) tablet 500 mg  500 mg Oral Q24H Darby Nunez NP   500 mg at 19 1315  acetaminophen/diphenhydrAMINE (TYLENOL PM EXT STR) 500/25 mg (Patient Supplied)   Oral QHS Lily Lopez MD      
 vit C,H-Nu-tfuno-lutein-zeaxan (PRESERVISION AREDS-2) capsule 1 Cap (Patient Supplied)  [address] Lily Lopez MD   1 Cap at 19 1011 OBJECTIVE: 
Patient Vitals for the past 8 hrs: 
 BP Temp Pulse Resp SpO2  
19 1148 111/58 99.3 °F (37.4 °C) 93 20 98 % 19 0815 149/69 98.9 °F (37.2 °C) 81 16 97 % 19 0520 117/68 99.2 °F (37.3 °C) 76 18 95 % Temp (24hrs), Av.7 °F (37.1 °C), Min:98 °F (36.7 °C), Max:99.3 °F (37.4 °C) 
 
 0701 -  1900 In: -  
Out: 7399 [[id number]] Physical Exam: 
Constitutional: Well developed, well nourished female in no acute distress, sitting comfortably in bedside chair. HEENT: Normocephalic and atraumatic. Oropharynx is clear, mucous membranes are moist. Extraocular muscles are intact. Sclerae anicteric. Neck supple without JVD. No thyromegaly present. Skin Warm and dry. No rash noted. No erythema. No pallor. Bruising noted on BUE/R elbow and abdomen. Respiratory Lungs are clear to auscultation bilaterally without wheezes, rales or rhonchi, normal air exchange without accessory muscle use. CVS Normal rate, regular rhythm and normal S1 and S2. No murmurs, gallops, or rubs. Abdomen Soft, mildly tender across lower abd-improving, nondistended, normoactive bowel sounds. No palpable mass. No hepatosplenomegaly. Neuro Grossly nonfocal with no obvious sensory or motor deficits. MSK Normal range of motion in general.  No edema and no tenderness. Psych Appropriate mood and affect. Labs:   
Recent Results (from the past 24 hour(s)) METABOLIC PANEL, COMPREHENSIVE Collection Time: 11/17/19  2:05 AM  
Result Value Ref Range Sodium 140 136 - 145 mmol/L Potassium 4.3 3.5 - 5.1 mmol/L Chloride 108 (H) 98 - 107 mmol/L  
 CO2 27 21 - 32 mmol/L Anion gap 5 (L) 7 - 16 mmol/L Glucose 119 (H) 65 - 100 mg/dL BUN 18 8 - 23 MG/DL Creatinine 0.53 (L) 0.6 - 1.0 MG/DL  
 GFR est AA >60 >60 ml/min/1.73m2 GFR est non-AA >60 >60 ml/min/1.73m2 Calcium 7.4 (L) 8.3 - 10.4 MG/DL Bilirubin, total 0.3 0.2 - 1.1 MG/DL  
 ALT (SGPT) 22 12 - 65 U/L  
 AST (SGOT) 9 (L) 15 - 37 U/L Alk. phosphatase 58 50 - 136 U/L Protein, total 5.3 (L) 6.3 - 8.2 g/dL Albumin 2.1 (L) 3.2 - 4.6 g/dL Globulin 3.2 2.3 - 3.5 g/dL A-G Ratio 0.7 (L) 1.2 - 3.5 MAGNESIUM Collection Time: 11/17/19  2:05 AM  
Result Value Ref Range Magnesium 2.2 1.8 - 2.4 mg/dL LD Collection Time: 11/17/19  2:05 AM  
Result Value Ref Range  (H) 110 - 210 U/L  
URIC ACID Collection Time: 11/17/19  2:05 AM  
Result Value Ref Range Uric acid 1.9 (L) 2.6 - 6.0 MG/DL  
PHOSPHORUS Collection Time: 11/17/19  2:05 AM  
Result Value Ref Range Phosphorus 2.9 2.3 - 3.7 MG/DL PROTHROMBIN TIME + INR Collection Time: 11/17/19  2:05 AM  
Result Value Ref Range Prothrombin time 15.6 (H) 11.7 - 14.5 sec INR 1.2 PTT Collection Time: 11/17/19  2:05 AM  
Result Value Ref Range aPTT 29.6 24.7 - 39.8 SEC FIBRINOGEN Collection Time: 11/17/19  2:05 AM  
Result Value Ref Range Fibrinogen 423 190 - 501 mg/dL CBC WITH AUTOMATED DIFF  
 Collection Time: 11/17/19  2:05 AM  
Result Value Ref Range WBC 0.0 (LL) 4.3 - 11.1 K/uL  
 RBC 2.33 (L) 4.05 - 5.2 M/uL HGB 6.9 (LL) 11.7 - 15.4 g/dL HCT 20.1 (LL) 35.8 - 46.3 % MCV 86.3 79.6 - 97.8 FL  
 MCH 29.6 26.1 - 32.9 PG  
 MCHC 34.3 31.4 - 35.0 g/dL  
 RDW 12.6 11.9 - 14.6 % PLATELET 16 (LL) 541 - 450 K/uL MPV 10.7 9.4 - 12.3 FL ABSOLUTE NRBC 0.00 0.0 - 0.2 K/uL  
 TOTAL CELLS COUNTED 0    
 RBC COMMENTS SLIGHT POIKILOCYTOSIS 
    
 WBC COMMENTS WBC TOO FEW TO DIFFERENTIATE PLATELET COMMENTS MARKED    
 DF MANUAL Imaging: XR ELBOW RT MIN 3 V [[id number]] Collected: 11/10/19 1421 Order Status: Completed Updated: 11/10/19 1424 Narrative:    
Right elbow Clinical location: Elbow pain after feeling a pop, decreased range of motion FINDINGS: Four views of the right elbow show no fracture or dislocation. There 
is no joint effusion. The soft tissues are unremarkable. Impression:    
IMPRESSION: No acute osseous abnormality or joint derangement of the right 
elbow. ASSESSMENT: 
Problem List  Date Reviewed: 10/31/2019 Codes Class Noted Febrile neutropenia (HCC) ICD-10-CM: D70.9, R50.81 ICD-9-CM: 288.00, 780.61  9/21/2019 Pancytopenia due to antineoplastic chemotherapy [facility]) ICD-10-CM: D61.810, T45.1X5A 
ICD-9-CM: 284.11, E933.1  6/12/2019 Cellulitis of neck ICD-10-CM: W94.296 ICD-9-CM: 682.1  6/12/2019 Immunocompromised status associated with infection ICD-10-CM: B99.9 ICD-9-CM: 136.9  6/12/2019 Port or reservoir infection ICD-10-CM: S90.978U ICD-9-CM: 999.33  6/12/2019 Acute myeloid leukemia not having achieved remission ([facility] [address] ICD-10-CM: C92.00 ICD-9-CM: 205.00  5/9/2019 Admission for antineoplastic chemotherapy ICD-10-CM: Z51.11 ICD-9-CM: V58.11  5/5/2019 [facility] ICD-10-CM: C92.00 ICD-9-CM: 205.00  4/28/2019 Weakness generalized ICD-10-CM: R53.1 ICD-9-CM: 780.79  4/28/2019 Pancytopenia ([facility] ICD-10-CM: C53.051 ICD-9-CM: 284.19  4/28/2019 Thrombocytopenia ([facility] ICD-10-CM: D69.6 ICD-9-CM: 287.5  4/27/2019 Ms. Milana Douglas is a 68 y.o. female admitted on 11/7/2019. She is a known patient of Dr. 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 She failed induction with 7+3/Midostaurin. On Dacogen/Gilteritinib with recent BMbx with persistent residual disease. She is being admitted for FLAG-VENITA. She is feeling well and is ready to proceed with treatment. PLAN: 
AML 
- admit for salvage FLAG-VENITA 
- needs Echo prior - ordered 11/8 Day 1 FLAG-VENITA. Echo with EF 55-60%, proceed with tx. 
11/9 Day 2 FLAG-VENITA. Tolerating well, no issues. Uric acid 3.1 today. 11/10 Day 3 FLAG-VENITA. No issues with treatment. Uric acid 3.3 today. 11/12 Day 5 FLAG-VENITA. Tolerating treatment well. G-CSF to start tomorrow. 11/13 Day 6 FLAG-VENITA, doing well, Granix/claritin starts today 11/14 Day 7 FLAG-VENITA, fatigued but doing well, continue granix 11/15 Day 8 FLAG-VENITA, continue granix 11/16 Day 9 FLAG-VENITA, WBC 0, continue granix 11/17 Day 10, WBC 0, continue granix Pancytopenia secondary to disease - Transfuse prn per [facility] SOPs R elbow pain 11/11 c/o R elbow pain with limited ROM yesterday. Xray neg. Pain improved today, noted bruising. Normal ROM on exam. 
 
Mild Abd discomfort/loose stools 11/13 discomfort is improved from yesterday, will monitor 11/14 loose stools, but not watery, can use Imodium prn 
11/15 Imodium working well  
11/16 controlled Continue home meds Prophylactic Antibx: Acyclovir, Voriconazole, Levaquin [facility] SOPs SCDs for DVT prophylaxis (AC contraindicated d/t thrombocytopenia) Disposition:  Will need to stay during count recovery after completion of chemotherapy. Expect hospitalization for several weeks. Upon discharge will need twice weekly labs w transfusions as needed.  Weekly provider visits. RTC within 1 week from discharge. Georgetta Pallas, NP [facility] [address] [place] [address] Office : [phone] Fax : [phone] Attending Addendum: 
I have personally performed a face to face diagnostic evaluation on this patient. I have reviewed and agree with the care plan as documented above by Darryle Morin, N.P.  My findings are as follows: Patient appears stable, heart rate regular without murmurs, abdomen is non-tender, bowel sounds are positive. 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 GCSF support started. Ongoing pancytopenia, transfuse blood products as needed. Right elbow discomfort with x-rays negative, now resolved. . Continue prophylactic antibiotics including Levaquin, acyclovir and voriconazole. More fatigued today. Imodium helping w loose stools. Awaiting count recovery. We will plan to keep in-house to hopeful counts recovery, BM biopsy at that time. Nausea controlled. Educated on po intake. Ambulation encouraged Total time 35 min 50% in direct consultation about the patient's diagnosis and management Nannie Merlin, MD 
[facility] [address] [place] [address] Office : [phone] Fax : [phone] Yes

## 2023-06-02 NOTE — ASU PREOP CHECKLIST - ALLERGIES REVIEWED
"Nghia Tirso seen in follow-up to discuss sleep study results and potential treatment options. Last seen in clinic by Dr. Michaels 01/24/2018. This is his initial visit with me.     INTERVAL HISTORY:    03/14/2018 HESHAM Javier NP: Of note, in -lab PSG denied by insurance; HST ordered instead. Discussed 02/21/2018 home sleep study results in detail. Pt complaints of snoring, poor sleep quality, and excessive daytime sleepiness remain the same.     Continues to take Concentra 36 mg daily for ADHD and Trazodone 300 mg w/ Clonidine 0.1 mg for insomnia. Both managed by PCP.     EPWORTH SLEEPINESS SCALE 3/13/2018   Sitting and reading 0   Watching TV 0   Sitting, inactive in a public place (e.g. a theatre or a meeting) 1   As a passenger in a car for an hour without a break 3   Lying down to rest in the afternoon when circumstances permit 0   Sitting and talking to someone 0   Sitting quietly after a lunch without alcohol 0   In a car, while stopped for a few minutes in traffic 3   Total score 7          01/24/2018 Dr. Michaels This 24 y.o. male patient presents for the evaluation of possible obstructive sleep apnea.    Patient not sleeping well since age 15.  Has tried numerous medications, but not working too well. Variable response.  Can feel hung over the next day    Takes 0.1 mg clonidine at bedtime and 300 mg trazodone at bedtime  Tried melatonin in the past.    He reports difficulty initiating sleep at night.  Not sleepy at bedtime, like morning "just struck".  Actually doesn't feel sleepy until around 6 am, but then still lies awake    He tried eliminating electronics from bedroom, darkening shades    In past, tried Concerta 36 mg for ADHD (tired, memory fog, daytime concentration difficulties) still taking    ESS = 1    Snoring when sleeping  Restless in sleep, toss and turn    Denies restless legs feeling      SLEEP ROUTINE:   Bed partner: own room   Time to bed: varies 10-11 pm   Sleep onset latency: prolonged   " "Disruptions or awakenings: none   Wakeup time: 6:30 am (work) if not having to work, stays in bed until about 10 am   Perceived sleep quality: poor   Daytime naps: none    Baseline Sleep Study: 02/21/2018  lb. The overall AHI was 14 and overall RDI was 41. The oxygen yas was 87.5 % and % time < 90% SpO2 was 0.1 %.      No past medical history on file.    No past surgical history on file.    Family History   Problem Relation Age of Onset    Retinal detachment Neg Hx     Macular degeneration Neg Hx     Glaucoma Neg Hx        Social History   Substance Use Topics    Smoking status: Never Smoker    Smokeless tobacco: Not on file    Alcohol use No       ALLERGIES: Reviewed in EPIC    CURRENT MEDICATIONS: Reviewed in EPIC    REVIEW OF SYSTEMS:  Sleep related symptoms as per HPI;    Denies weight gain;    Sometimes sinus problems;    Denies dyspnea;    Denies palpitations;    Denies acid reflux;    Denies polyuria;    sometimes body aches   Denies headaches;    sometimes mood disturbance;    Denies anemia;    Otherwise, a balance of systems reviewed is negative.    PHYSICAL EXAM:  /76   Pulse 84   Ht 5' 9" (1.753 m)   Wt 83 kg (182 lb 15.7 oz)   BMI 27.02 kg/m²   GENERAL: Well groomed; Normally developed;  HEENT: Conjunctivae are non-erythematous; Pupils equal, round, and reactive to light;    Nose is symmetrical; Nasal mucosa is pink and moist; Septum is midline;    Turbinates are normal; Nasal airflow is normal;    Posterior pharynx is pink; Posterior palate is very low; Modified Mallampati: IV   Uvula is normal; Tongue is normal; Tonsils +1;    Dentition is fair; No TMJ tenderness;    Jaw opening and protrusion without click and without discomfort.  NECK: Supple. No thyromegaly. No palpable nodes. Neck circumference in inches is 14.5  SKIN: On face and neck: No abrasions, no rashes, no lesions.     No subcutaneous nodules are palpable.  RESPIRATORY: Chest is clear to auscultation.     Normal " chest expansion and non-labored breathing at rest.  CARDIOVASCULAR: Normal S1, S2.  No murmurs, gallops or rubs.   No carotid bruits bilaterally.  EXTREMITIES: No clubbing. No cyanosis. Edema is absent.   NEURO: Oriented to time, place and person.    Normal attention span and concentration.  Station normal. Gait normal.  PSYCH: Affect is full. Mood is normal.    ASSESSMENT:    Obstructive sleep apnea, mild by AHI, severe by RDI. The patient symptomatically has snoring, poor sleep quality, and excessive daytime sleepiness (masked by Concerta) with exam findings of a crowded oral airway. Medical co-morbidities: overweight BMI.     Insomnia NEC. He has tried multiple medications, including melatonin. Difficulty initiating and sustaining sleep, which could be related to underlying sleep apnea.         He states his goal is to sleep without medications.    PLAN:    Education: During our discussion today, we talked about the etiology of obstructive sleep apnea as well as the potential ramifications of untreated sleep apnea, which could include daytime sleepiness, hypertension, heart disease and/or stroke. We discussed potential treatment options, which could include weight loss, body positioning, continuous positive airway pressure (CPAP), OA, EPAP, or referral for surgical consideration.     Treatment:  -trial auto CPAP 4 - 20 cm @ [facility]. RTC 31 - 90 days after CPAP set up  -Pt to discuss wean off Trazodone with PCP to minimize potential withdrawal symptoms; for now continue current regimen as acclimating to PAP therapy  -If patient has difficulty with CPAP adherence or ongoing JAYLEN symptoms despite CPAP adherence, then consider an in-lab titration sleep study in order to determine optimal fixed CPAP setting.    Precautions: The patient was advised to abstain from driving should they feel sleepy  or drowsy.   done

## 2023-06-02 NOTE — ASU DISCHARGE PLAN (ADULT/PEDIATRIC) - CARE PROVIDER_API CALL
Murphy Lee  Surgery  [address] [place] [id number]  Phone: [phone]  Fax: [phone]  Follow Up Time: 2 weeks

## 2023-06-02 NOTE — BRIEF OPERATIVE NOTE - OPERATION/FINDINGS
Open inguinal hernia repair, left. with Bard perfix mesh size large plug and patch. REF [id number] LOT [id number]. Antibiotic irrigation used.

## 2023-06-02 NOTE — CHART NOTE - NSCHARTNOTEFT_GEN_A_CORE
PACU ANESTHESIA ADMISSION NOTE      Procedure:   Post op diagnosis:      ____  Intubated  TV:______       Rate: ______      FiO2: ______    __x__  Patent Airway    __x__  Full return of protective reflexes    __x__  Full recovery from anesthesia / back to baseline     Vitals:   T:   98.1        R:   18               BP: 114/56                 Sat:  98                 P:  72      Mental Status:  __x__ Awake   __x___ Alert   _____ Drowsy   _____ Sedated    Nausea/Vomiting:  __x__ NO  ______Yes,   See Post - Op Orders          Pain Scale (0-10):  ___0__    Treatment: ____ None    ____ See Post - Op/PCA Orders    Post - Operative Fluids:   ____ Oral   ____ See Post - Op Orders    Plan: Discharge:   _x___Home       _____Floor     _____Critical Care    _____  Other:_________________    Comments:

## 2023-06-02 NOTE — ASU DISCHARGE PLAN (ADULT/PEDIATRIC) - ASU DC SPECIAL INSTRUCTIONSFT
Dressings : Keep dressing dry for 2 days (no showering; sponge bath ok). You may remove it in 48h. Leave steri-strips in place, they will fall off on their own in 1 week.    Diet : You may resume your regular diet. It is recommended to eat a high fiber diet and increase fluid intake to avoid constipation. Take stool softeners if needed to avoid straining with stool.    Pain : Take Tylenol #3 as needed for pain every 6 hours. Unless otherwise specified by a physician, you may alternate with Motrin every 8 hours. Do not exceed more than 4000mg of Tylenol (acetaminophen) per day    Activity : No heavy lifting for 6 weeks (no lifting more than ~10-15 pounds). No tub baths, no pool or ocean for at least 2 weeks. Do not submerge the incisions in water. You may shower and let water run over the incision, but do not submerge.    YOU MAY RESUME YOUR ELIQUIS TOMORROW 6/3    Follow up : With Dr. Lee as scheduled. Call the office if you have any questions or concerns.

## 2023-06-07 DIAGNOSIS — K40.90 UNILATERAL INGUINAL HERNIA, WITHOUT OBSTRUCTION OR GANGRENE, NOT SPECIFIED AS RECURRENT: ICD-10-CM

## 2023-06-07 DIAGNOSIS — I48.91 UNSPECIFIED ATRIAL FIBRILLATION: ICD-10-CM

## 2023-06-07 DIAGNOSIS — M41.9 SCOLIOSIS, UNSPECIFIED: ICD-10-CM

## 2023-06-07 DIAGNOSIS — Z99.89 DEPENDENCE ON OTHER ENABLING MACHINES AND DEVICES: ICD-10-CM

## 2023-06-07 DIAGNOSIS — G47.33 OBSTRUCTIVE SLEEP APNEA (ADULT) (PEDIATRIC): ICD-10-CM

## 2023-06-07 DIAGNOSIS — I10 ESSENTIAL (PRIMARY) HYPERTENSION: ICD-10-CM

## 2023-06-07 DIAGNOSIS — M19.90 UNSPECIFIED OSTEOARTHRITIS, UNSPECIFIED SITE: ICD-10-CM

## 2023-06-07 DIAGNOSIS — L40.9 PSORIASIS, UNSPECIFIED: ICD-10-CM

## 2023-06-07 DIAGNOSIS — Z79.01 LONG TERM (CURRENT) USE OF ANTICOAGULANTS: ICD-10-CM

## 2023-06-07 DIAGNOSIS — E78.00 PURE HYPERCHOLESTEROLEMIA, UNSPECIFIED: ICD-10-CM

## 2023-06-15 ENCOUNTER — APPOINTMENT (OUTPATIENT)
Dept: SURGERY | Facility: CLINIC | Age: 73
End: 2023-06-15
Payer: MEDICARE

## 2023-06-15 VITALS
OXYGEN SATURATION: 98 % | DIASTOLIC BLOOD PRESSURE: 78 MMHG | SYSTOLIC BLOOD PRESSURE: 124 MMHG | HEART RATE: 80 BPM | HEIGHT: 62 IN | BODY MASS INDEX: 22.26 KG/M2 | TEMPERATURE: 96.9 F | WEIGHT: 121 LBS

## 2023-06-15 PROCEDURE — 99024 POSTOP FOLLOW-UP VISIT: CPT

## 2023-06-15 NOTE — PHYSICAL EXAM
[Abdominal Masses] : No abdominal masses [Abdomen Tenderness] : ~T ~M No abdominal tenderness [No HSM] : no hepatosplenomegaly [de-identified] :  healthy [de-identified] :  soft and not distended.  Left inguinal hernia repair intact, left groin incision is clean and dry, no evidence of hematoma or infection.  Mild local induration only.

## 2023-06-15 NOTE — ASSESSMENT
[FreeTextEntry1] :   No postoperative problems noted.  Local care and activity instructions were reviewed, and she will return in about 3 months for final check, or sooner as needed.  She is noted to be anemic and was asked to start iron supplementation by her PCP, I believe she can start this now along with some daily prune juice and additional fluids to avoid constipation and straining.  All their questions were answered.

## 2023-06-15 NOTE — HISTORY OF PRESENT ILLNESS
[de-identified] : The patient returns with her  for her first postoperative visit after the recent left inguinal hernia repair with mesh.  She has no complaints regarding the surgery, she did not require any prescription analgesics and reports normal appetite, bowel and bladder function

## 2023-06-16 PROBLEM — Z80.0 FAMILY HISTORY OF PANCREATIC CANCER: Status: ACTIVE | Noted: 2023-01-30

## 2023-06-16 PROBLEM — Z01.419 WOMEN'S ANNUAL ROUTINE GYNECOLOGICAL EXAMINATION: Status: ACTIVE | Noted: 2021-02-10

## 2023-06-21 PROBLEM — Z71.89 OTHER SPECIFIED COUNSELING: Status: ACTIVE | Noted: 2023-06-21

## 2023-06-21 PROBLEM — Z82.69 FAMILY HISTORY OF OSTEOPENIA: Status: ACTIVE | Noted: 2023-06-16

## 2023-06-21 PROBLEM — Z87.39 HISTORY OF OSTEOPENIA: Status: RESOLVED | Noted: 2023-06-16 | Resolved: 2023-06-21

## 2023-06-21 PROBLEM — Z86.69 HISTORY OF SLEEP APNEA: Status: RESOLVED | Noted: 2023-06-21 | Resolved: 2023-06-21

## 2023-06-21 PROBLEM — Z87.42 HISTORY OF OVARIAN CYST: Status: RESOLVED | Noted: 2023-06-16 | Resolved: 2023-06-21

## 2023-06-21 PROBLEM — Z86.79 HISTORY OF ATRIAL FIBRILLATION: Status: RESOLVED | Noted: 2023-06-21 | Resolved: 2023-06-21

## 2023-06-21 PROBLEM — M19.90 OSTEOARTHRITIS: Status: RESOLVED | Noted: 2023-06-16 | Resolved: 2023-06-21

## 2023-06-21 PROBLEM — Z86.39 HISTORY OF HIGH CHOLESTEROL: Status: RESOLVED | Noted: 2023-06-21 | Resolved: 2023-06-21

## 2023-06-21 PROBLEM — N95.2 ATROPHIC VULVOVAGINITIS: Status: ACTIVE | Noted: 2023-06-21

## 2023-06-21 PROBLEM — Z80.8 FAMILY HISTORY OF MALIGNANT MELANOMA OF SKIN: Status: ACTIVE | Noted: 2023-06-16

## 2023-06-21 PROBLEM — Z80.8 FAMILY HISTORY OF MALIGNANT NEOPLASM OF THYROID: Status: ACTIVE | Noted: 2023-06-16

## 2023-06-21 PROBLEM — Z86.39 HISTORY OF THYROID NODULE: Status: RESOLVED | Noted: 2023-06-16 | Resolved: 2023-06-21

## 2023-06-21 NOTE — PHYSICAL EXAM
[Appropriately responsive] : appropriately responsive [Alert] : alert [No Acute Distress] : no acute distress [No Lymphadenopathy] : no lymphadenopathy [Regular Rate Rhythm] : regular rate rhythm [No Murmurs] : no murmurs [Soft] : soft [Non-tender] : non-tender [Non-distended] : non-distended [No Mass] : no mass [Oriented x3] : oriented x3 [FreeTextEntry7] : Left inguinal hernia noted/reducible and no strangulation-patient has appointment with surgeon [Examination Of The Breasts] : a normal appearance [No Discharge] : no discharge [No Masses] : no breast masses were palpable [Vulvar Atrophy] : vulvar atrophy [No Lesions] : no lesions  [Labia Majora] : normal [Labia Minora] : normal [Normal] : normal [Atrophy] : atrophy [No Bleeding] : There was no active vaginal bleeding [Tenderness] : nontender [Enlarged ___ wks] : not enlarged [Mass ___ cm] : no uterine mass was palpated [Uterine Adnexae] : normal [FreeTextEntry5] : Pap smear done

## 2023-06-21 NOTE — DISCUSSION/SUMMARY
[FreeTextEntry1] : A: 72-year-old for annual GYN exam, vulvovaginal atrophy, inguinal hernia, dense breasts, osteopenia\par \par P: GYN exam done\par Pap smear done\par Safe sex practices if active\par Pain and bleeding precautions\par Referral for mammo and sono of breast given\par Encourage healthy diet and exercise\par Follow-up for routine exam or as needed

## 2023-06-21 NOTE — HISTORY OF PRESENT ILLNESS
[Patient reported bone density results were abnormal] : Patient reported bone density results were abnormal [Patient reported colonoscopy was normal] : Patient reported colonoscopy was normal [LMP unknown] : LMP unknown [postmenopausal] : postmenopausal [N] : Patient is not sexually active [unknown] : Patient is unsure of the date of her LMP [Menarche Age: ____] : age at menarche was [unfilled] [Currently In Menopause] : currently in menopause [Menopause Age: ____] : age at menopause was [unfilled] [Patient reported mammogram was normal] : Patient reported mammogram was normal [Patient reported PAP Smear was normal] : Patient reported PAP Smear was normal [Y] : Positive pregnancy history [Previously active] : previously active [Men] : men [HPV test offered] : HPV test offered [No] : No [TextBox_4] : 72-year-old para 2-0-0-2 in menopause came for annual GYN exam and Pap smear.  Patient denies postmenopausal bleeding, pelvic pain, discharge, dysuria or other GYN complaints.  She denies history of abnormal Pap, HPV, STDs or fibroids.  She does have a remote history of ovarian cyst but is asymptomatic.  She is not currently sexually active.\par \par Patient has a strong family history of potential hereditary cancers including 2 with pancreatic cancer.  Patient counseled on hereditary cancer gene testing and after all counseling done and all questions answered satisfactorily patient would like to do testing today. [Mammogramdate] : 2/2021 [TextBox_19] : Will give referral [TextBox_25] : Will give referral for dense tissue [PapSmeardate] : 2019 [BoneDensityDate] : 2022 [TextBox_37] : osteopenia  [ColonoscopyDate] : 2021 [TextBox_43] : f/u 5 yrs  [PGHxTotal] : 2 [[facility] : 2 [PGHxPremature] : 0 [PGHxAbortions] : 0 [[facility]] : 2 [FreeTextEntry1] :

## 2023-09-07 ENCOUNTER — APPOINTMENT (OUTPATIENT)
Dept: SURGERY | Facility: CLINIC | Age: 73
End: 2023-09-07
Payer: MEDICARE

## 2023-09-07 VITALS
SYSTOLIC BLOOD PRESSURE: 130 MMHG | DIASTOLIC BLOOD PRESSURE: 70 MMHG | HEART RATE: 78 BPM | TEMPERATURE: 97.9 F | WEIGHT: 120 LBS | OXYGEN SATURATION: 96 % | HEIGHT: 62 IN | BODY MASS INDEX: 22.08 KG/M2

## 2023-09-07 DIAGNOSIS — K40.90 UNILATERAL INGUINAL HERNIA, W/OUT OBSTRUCTION OR GANGRENE, NOT SPECIFIED AS RECURRENT: ICD-10-CM

## 2023-09-07 PROCEDURE — 99212 OFFICE O/P EST SF 10 MIN: CPT

## 2023-09-07 NOTE — HISTORY OF PRESENT ILLNESS
[de-identified] : The patient returns with her  for her first postoperative visit after the recent left inguinal hernia repair with mesh.  She has no complaints regarding the surgery, she did not require any prescription analgesics and reports normal appetite, bowel and bladder function  09/07/23: The patient returns for a second postoperative visit with her  s/p left inguinal hernia repair with mesh on 06/02/23.

## 2023-09-07 NOTE — ASSESSMENT
[FreeTextEntry1] :  No postoperative problems noted.  Local care and activity instructions were reviewed, and she will return in about 3 months for final check, or sooner as needed.  She is noted to be anemic and was asked to start iron supplementation by her PCP, I believe she can start this now along with some daily prune juice and additional fluids to avoid constipation and straining.  All their questions were answered.  09/07/23: The patient returns for a second postoperative visit with her  s/p left inguinal hernia repair with mesh on 06/02/23. She is doing well. She reports normal appetite, bowel, and bladder function. Her left inguinal hernia repair scar is healing well. No new bulges. All of their questions were answered. Instructed to call office with any questions or concerns.

## 2023-09-07 NOTE — PHYSICAL EXAM
[Abdominal Masses] : No abdominal masses [Abdomen Tenderness] : ~T ~M No abdominal tenderness [No HSM] : no hepatosplenomegaly [de-identified] :  healthy [de-identified] :  soft and not distended.  Left inguinal hernia repair intact, left groin incision is clean and dry, no evidence of hematoma or infection.

## 2023-11-13 ENCOUNTER — APPOINTMENT (OUTPATIENT)
Dept: PULMONOLOGY | Facility: CLINIC | Age: 73
End: 2023-11-13
Payer: MEDICARE

## 2023-11-13 VITALS
OXYGEN SATURATION: 96 % | HEIGHT: 60 IN | SYSTOLIC BLOOD PRESSURE: 130 MMHG | HEART RATE: 75 BPM | WEIGHT: 123 LBS | BODY MASS INDEX: 24.15 KG/M2 | DIASTOLIC BLOOD PRESSURE: 78 MMHG

## 2023-11-13 DIAGNOSIS — R06.02 SHORTNESS OF BREATH: ICD-10-CM

## 2023-11-13 PROCEDURE — 99213 OFFICE O/P EST LOW 20 MIN: CPT

## 2023-11-20 ENCOUNTER — APPOINTMENT (OUTPATIENT)
Dept: ELECTROPHYSIOLOGY | Facility: CLINIC | Age: 73
End: 2023-11-20
Payer: MEDICARE

## 2023-11-20 VITALS
HEART RATE: 67 BPM | TEMPERATURE: 98 F | BODY MASS INDEX: 24.15 KG/M2 | DIASTOLIC BLOOD PRESSURE: 70 MMHG | HEIGHT: 60 IN | SYSTOLIC BLOOD PRESSURE: 123 MMHG | WEIGHT: 123 LBS

## 2023-11-20 DIAGNOSIS — R00.2 PALPITATIONS: ICD-10-CM

## 2023-11-20 DIAGNOSIS — G47.33 OBSTRUCTIVE SLEEP APNEA (ADULT) (PEDIATRIC): ICD-10-CM

## 2023-11-20 PROCEDURE — 93000 ELECTROCARDIOGRAM COMPLETE: CPT

## 2023-11-20 PROCEDURE — 99214 OFFICE O/P EST MOD 30 MIN: CPT

## 2023-11-20 RX ORDER — CHLORTHALIDONE 25 MG/1
25 TABLET ORAL
Qty: 90 | Refills: 3 | Status: ACTIVE | COMMUNITY

## 2023-11-20 RX ORDER — ATORVASTATIN CALCIUM 20 MG/1
20 TABLET, FILM COATED ORAL DAILY
Refills: 0 | Status: ACTIVE | COMMUNITY

## 2023-11-20 RX ORDER — CALCIUM CARBONATE/VITAMIN D3 600 MG-20
TABLET ORAL
Refills: 0 | Status: ACTIVE | COMMUNITY

## 2023-11-20 RX ORDER — LOSARTAN POTASSIUM 25 MG/1
25 TABLET, FILM COATED ORAL
Qty: 3 | Refills: 3 | Status: ACTIVE | COMMUNITY

## 2023-11-20 RX ORDER — UBIDECARENONE/VIT E ACET 100MG-5
100 CAPSULE ORAL
Refills: 0 | Status: ACTIVE | COMMUNITY

## 2023-11-28 NOTE — H&P PST ADULT - PRO INTERPRETER NEED 2
[facility]    Infectious Disease Progress Note    Date of Service (when I saw the patient): 11/28/2023     Assessment & Plan   Tre Vazquez is a 49 year old who was admitted on 11/24/2023.       Impression:  48 yo coming in from the  with mentation changes   PMH is very complicated He lives in a group home.  He has some developmental delay, morbid obesity, ANA and OHS using chronically BiPAP at night apparently.  History of seizure disorder and chronic hyperammonemia while on Depakote.  Schizoaffective disorder, borderline personality and left eye blindness.   Blood cultures with MSSA   Cephalosporin listed as an allergy allergy listed as a rash per chart reviewed patient has been placed on zosyn before no reaction listed   He is on vancomycin   He has a documented picture in the chart from 11/ 20 with a wound on the buttocks he was seen in the ER for this and prescribed doxycycline on 11/ 20, ? Source   Repeat blood cultures from 11/ 26 are negative so far      Recommendations:   Continue on nafcillin for MSSA bacteremia tolerating it without any issues so far.   No clear source but repeat cultures starting 11/ 26 so far negative.   If more positive blood cultures will need JENNIE, TTE did not visualize the valve well but no gross abnormity     Once blood cultures clear for 72 hours will need a Midline and at the minimum 4 weeks total of antibiotics starting the day of first negative blood cultures.     Do not put any midline or picc till blood cultures clear for 72 hours.     Kecia Page MD    Interval History   Tolerating antibiotics ok   No new rashes or issues with antibiotics   Labs reviewed   No changes to past medical, social or family history   No fever       Physical Exam   Temp: 97.4  F (36.3  C) Temp src: Axillary BP: 119/64 Pulse: 74   Resp: 14 SpO2: 97 % O2 Device: Nasal cannula Oxygen Delivery: 7 LPM  There were no vitals filed for this visit.  Vital Signs with Ranges  Temp:   "[97.4  F (36.3  C)-98.7  F (37.1  C)] 97.4  F (36.3  C)  Pulse:  [74-82] 74  Resp:  [14-23] 14  BP: (114-141)/(52-75) 119/64  FiO2 (%):  [45 %] 45 %  SpO2:  [95 %-98 %] 97 %    Constitutional: Awake, alert, cooperative, no apparent distress  Lungs: Clear to auscultation bilaterally, no crackles or wheezing  Cardiovascular: Regular rate and rhythm, normal S1 and S2, and no murmur noted  Abdomen: Normal bowel sounds, soft, non-distended, non-tender  Skin: No rashes, no cyanosis, no edema  Other:    Medications      busPIRone  15 mg Oral BID    citalopram  60 mg Oral Daily    divalproex sodium delayed-release  500 mg Oral Q12H [facility] (08/20)    fluticasone  1 spray Nasal BID    lamoTRIgine  200 mg Oral BID    levETIRAcetam  1,000 mg Oral QAM    And    levETIRAcetam  1,500 mg Oral At Bedtime    levOCARNitine  660 mg Oral TID    miconazole   Topical BID    multivitamin, therapeutic  1 tablet Oral Daily    nafcillin  2 g Intravenous Q4H    pantoprazole  40 mg Oral Daily    polyethylene glycol  17 g Oral Daily    prazosin  2 mg Oral At Bedtime    propranolol  20 mg Oral BID    QUEtiapine ER  300 mg Oral At Bedtime    zonisamide  100 mg Oral QAM    And    zonisamide  200 mg Oral At Bedtime       Data   All microbiology laboratory data reviewed.  Recent Labs   Lab Test 11/26/23  0648 11/26/23  0056 11/24/23  1042   WBC 6.7 7.3 7.4   HGB 11.8* 12.1* 12.0*   HCT 39.4* 40.9 39.8*   * 102* 100    224 239     Recent Labs   Lab Test 11/28/23  0747 11/27/23  0759 11/26/23  0648   CR 0.65* 0.68  0.68 0.61*     No lab results found.  No lab results found.    Invalid input(s): \"UC\"    All cultures:  Recent Labs   Lab 11/26/23  1314 11/26/23  1309 11/25/23  0608 11/25/23  0558 11/24/23  1254 11/24/23  1042   CULTURE No growth after 1 day No growth after 1 day No growth after 2 days Positive on the 1st day of incubation*  Staphylococcus aureus* Positive on the 1st day of incubation*  Staphylococcus aureus* Positive on the " 1st day of incubation*  Staphylococcus aureus*      Blood culture:  Results for orders placed or performed during the hospital encounter of 11/24/23   Blood Culture Hand, Left    Specimen: Hand, Left; Blood   Result Value Ref Range    Culture No growth after 1 day    Blood Culture Arm, Right    Specimen: Arm, Right; Blood   Result Value Ref Range    Culture No growth after 1 day    Blood Culture Arm, Right    Specimen: Arm, Right; Blood   Result Value Ref Range    Culture No growth after 2 days    Blood Culture Arm, Right    Specimen: Arm, Right; Blood   Result Value Ref Range    Culture Positive on the 1st day of incubation (A)     Culture Staphylococcus aureus (AA)    Blood Culture Hand, Left    Specimen: Hand, Left; Blood   Result Value Ref Range    Culture Positive on the 1st day of incubation (A)     Culture Staphylococcus aureus (AA)    Blood Culture Peripheral Blood    Specimen: Peripheral Blood   Result Value Ref Range    Culture Positive on the 1st day of incubation (A)     Culture Staphylococcus aureus (AA)        Susceptibility    Staphylococcus aureus - JOMAR     Oxacillin* <=0.25 Susceptible ug/mL      * Oxacillin susceptible isolates are susceptible to cephalosporins (example: cefazolin and cephalexin) and beta lactam combination agents. Oxacillin resistant isolates are resistant to these agents.     Gentamicin <=0.5 Susceptible ug/mL     Erythromycin <=0.25 Susceptible ug/mL     Clindamycin 0.25 Susceptible ug/mL     Vancomycin 1 Susceptible ug/mL     Daptomycin 0.5 Susceptible ug/mL     Tetracycline <=1 Susceptible ug/mL     Doxycycline <=0.5 Susceptible ug/mL     Trimethoprim/Sulfamethoxazole 8/152 Resistant ug/mL   Results for orders placed or performed during the hospital encounter of 05/12/23   Blood Culture Hand, Left    Specimen: Hand, Left; Blood   Result Value Ref Range    Culture No Growth    Blood Culture Line, venous    Specimen: Line, venous; Blood   Result Value Ref Range    Culture No  Growth    Results for orders placed or performed during the hospital encounter of 05/04/23   Blood Culture Peripheral Blood    Specimen: Peripheral Blood   Result Value Ref Range    Culture No Growth    Blood Culture Peripheral Blood    Specimen: Peripheral Blood   Result Value Ref Range    Culture No Growth       Urine culture:  Results for orders placed or performed in visit on 12/19/08   Urine culture   Result Value Ref Range    Specimen Description Midstream Urine     Culture Micro       10 to 50,000 colonies/mL Multiple species present, probable perineal    Micro Report Status FINAL 12/21/2008               English

## 2024-04-26 ENCOUNTER — OUTPATIENT (OUTPATIENT)
Dept: OUTPATIENT SERVICES | Facility: HOSPITAL | Age: 74
LOS: 1 days | End: 2024-04-26
Payer: MEDICARE

## 2024-04-26 ENCOUNTER — APPOINTMENT (OUTPATIENT)
Age: 74
End: 2024-04-26
Payer: MEDICARE

## 2024-04-26 ENCOUNTER — LABORATORY RESULT (OUTPATIENT)
Age: 74
End: 2024-04-26

## 2024-04-26 VITALS
SYSTOLIC BLOOD PRESSURE: 149 MMHG | DIASTOLIC BLOOD PRESSURE: 94 MMHG | WEIGHT: 126 LBS | HEART RATE: 70 BPM | RESPIRATION RATE: 17 BRPM | TEMPERATURE: 98.4 F | BODY MASS INDEX: 23.79 KG/M2 | HEIGHT: 61 IN | OXYGEN SATURATION: 97 %

## 2024-04-26 DIAGNOSIS — Z98.890 OTHER SPECIFIED POSTPROCEDURAL STATES: Chronic | ICD-10-CM

## 2024-04-26 DIAGNOSIS — D64.9 ANEMIA, UNSPECIFIED: ICD-10-CM

## 2024-04-26 DIAGNOSIS — L40.9 PSORIASIS, UNSPECIFIED: ICD-10-CM

## 2024-04-26 PROCEDURE — 83921 ORGANIC ACID SINGLE QUANT: CPT

## 2024-04-26 PROCEDURE — 83540 ASSAY OF IRON: CPT

## 2024-04-26 PROCEDURE — 85027 COMPLETE CBC AUTOMATED: CPT

## 2024-04-26 PROCEDURE — 86334 IMMUNOFIX E-PHORESIS SERUM: CPT

## 2024-04-26 PROCEDURE — 83550 IRON BINDING TEST: CPT

## 2024-04-26 PROCEDURE — 86431 RHEUMATOID FACTOR QUANT: CPT

## 2024-04-26 PROCEDURE — 84155 ASSAY OF PROTEIN SERUM: CPT

## 2024-04-26 PROCEDURE — 86038 ANTINUCLEAR ANTIBODIES: CPT

## 2024-04-26 PROCEDURE — 99204 OFFICE O/P NEW MOD 45 MIN: CPT

## 2024-04-26 PROCEDURE — 82607 VITAMIN B-12: CPT

## 2024-04-26 PROCEDURE — 84443 ASSAY THYROID STIM HORMONE: CPT

## 2024-04-26 PROCEDURE — 80053 COMPREHEN METABOLIC PANEL: CPT

## 2024-04-26 PROCEDURE — 83010 ASSAY OF HAPTOGLOBIN QUANT: CPT

## 2024-04-26 PROCEDURE — 83615 LACTATE (LD) (LDH) ENZYME: CPT

## 2024-04-26 PROCEDURE — 82746 ASSAY OF FOLIC ACID SERUM: CPT

## 2024-04-26 PROCEDURE — 82728 ASSAY OF FERRITIN: CPT

## 2024-04-26 PROCEDURE — 86140 C-REACTIVE PROTEIN: CPT

## 2024-04-26 PROCEDURE — 84165 PROTEIN E-PHORESIS SERUM: CPT

## 2024-04-26 RX ORDER — CLOBETASOL PROPIONATE 0.5 MG/G
0.05 CREAM TOPICAL
Refills: 0 | Status: ACTIVE | COMMUNITY

## 2024-04-27 DIAGNOSIS — D64.9 ANEMIA, UNSPECIFIED: ICD-10-CM

## 2024-05-02 LAB
ALBUMIN MFR SERPL ELPH: 54.6 %
ALBUMIN SERPL ELPH-MCNC: 4.8 G/DL
ALBUMIN SERPL-MCNC: 4.1 G/DL
ALBUMIN/GLOB SERPL: 1.2 RATIO
ALP BLD-CCNC: 99 U/L
ALPHA1 GLOB MFR SERPL ELPH: 5.2 %
ALPHA1 GLOB SERPL ELPH-MCNC: 0.4 G/DL
ALPHA2 GLOB MFR SERPL ELPH: 13.2 %
ALPHA2 GLOB SERPL ELPH-MCNC: 1 G/DL
ALT SERPL-CCNC: 27 U/L
ANION GAP SERPL CALC-SCNC: 15 MMOL/L
AST SERPL-CCNC: 33 U/L
B-GLOBULIN MFR SERPL ELPH: 10.7 %
B-GLOBULIN SERPL ELPH-MCNC: 0.8 G/DL
BILIRUB SERPL-MCNC: 0.7 MG/DL
BUN SERPL-MCNC: 28 MG/DL
CALCIUM SERPL-MCNC: 10.9 MG/DL
CHLORIDE SERPL-SCNC: 96 MMOL/L
CO2 SERPL-SCNC: 28 MMOL/L
CREAT SERPL-MCNC: 0.9 MG/DL
CRP SERPL-MCNC: <3 MG/L
EGFR: 68 ML/MIN/1.73M2
FERRITIN SERPL-MCNC: 71 NG/ML
FOLATE SERPL-MCNC: >20 NG/ML
GAMMA GLOB FLD ELPH-MCNC: 1.2 G/DL
GAMMA GLOB MFR SERPL ELPH: 16.3 %
GLUCOSE SERPL-MCNC: 98 MG/DL
HAPTOGLOB SERPL-MCNC: 240 MG/DL
INTERPRETATION SERPL IEP-IMP: NORMAL
IRON SATN MFR SERPL: 16 %
IRON SERPL-MCNC: 55 UG/DL
LDH SERPL-CCNC: 274
M PROTEIN MFR SERPL ELPH: NORMAL
MONOCLON BAND OBS SERPL: NORMAL
POTASSIUM SERPL-SCNC: 4.1 MMOL/L
PROT SERPL-MCNC: 7.6 G/DL
PROT SERPL-MCNC: 7.6 G/DL
PROT SERPL-MCNC: 7.9 G/DL
SODIUM SERPL-SCNC: 139 MMOL/L
TIBC SERPL-MCNC: 351 UG/DL
TSH SERPL-ACNC: 2.26 UIU/ML
UIBC SERPL-MCNC: 296 UG/DL
VIT B12 SERPL-MCNC: 1107 PG/ML

## 2024-05-02 NOTE — ASSESSMENT
[FreeTextEntry1] : 74 yo F presents to the office today for initial consultation for Anemia. Pt was referred by  (electrophysiologist) to see  for Anemia. Pt has a PMHx of Anemia, CORAL, HTN, Osteopenia, Osteoarthritis, A-fib, Psoriasis. Pt has a PSHx of Inguinal hernia, carpal tunnel B/L, cyst I&D. Pt has a FHx of HTN, malignant of skin & thyroid, pancreatic CA, osteopenia, myocardial infarction. Pt admits to taking iron PO for a few months then stopped because abdominal pain and constipation. Never received blood/iron infusion Pt admits to having hemorrhoids that bleeds rarely when she is constipated. Medications: See medication list taking as prescribed. Hernia Sx 6/2/2023  Patient denies fever, diarrhea, fatigue, chills, nausea, vomiting, dyspnea, unintentional weight loss.  Impression: Anemia   Plan: Previous chart and previous labs reviewed. Last Labs on 2/15/2024 WBC 6.18, Hgb 11.1, Hct 33, , MCV 90.9, Ferritin 87, Iron 45, % Iron sat 15 (low), TIBC 307 B12 872, Folate >20 (high) TSH 1.92, T4 1.45 BUN 26 (high), BUN/CREAT RATIO 34.2 (high). Today ordered SABIHA, B12, Folate, CMP, CRP, Ferritin, Haptoglobin, Serum BENI, Iron, TIBC, LDH, MMA, SPES, RF, TSH, Reticulocyte Count.  Will call pt next week to go over labs. Pt was told to call on Friday 5/3/2024 if we don't contact her, Possible Iron IV.   We explained possible side effect from Iron infusion is not limited to anaphylactic reaction, headache, hives, itching wheezing, difficulty breathing, a lightheaded feeling, swelling of face, lips, tongue or throat, delay reaction and abdominal discomfort. Pt will follow up with her GYN, PCP, GI as directed.  Pt was also seen by JERRICA Schneider.  RTC in 2mo with Dr. Lancaster, earlier if symptoms worsen or persist.

## 2024-05-02 NOTE — PHYSICAL EXAM
[Restricted in physically strenuous activity but ambulatory and able to carry out work of a light or sedentary nature] : Status 1- Restricted in physically strenuous activity but ambulatory and able to carry out work of a light or sedentary nature, e.g., light house work, office work [Normal] : affect appropriate [de-identified] : jaylinis  [de-identified] : Psorosis rash B/L on LE

## 2024-05-02 NOTE — HISTORY OF PRESENT ILLNESS
[de-identified] : 72 yo F presents to the office today for initial consultation for Anemia. Pt was referred by  (electrophysiologist) to see   for Anemia. Pt has a PMHx of Anemia, CORAL, HTN, Osteopenia, Osteoarthritis, A-fib, Psoriasis. Pt has a PSHx of Inguinal hernia, carpal tunnel B/L, back cyst I&D. Pt has a FHx of HTN, malignant of skin & thyroid, pancreatic CA, osteopenia, myocardial infarction.  Pt admits to taking iron PO for a few months then stopped because abdominal pain and constipation. Never received blood transfusion or iron infusion. Pt admits to having Hemorrhoids that bleed rarely when she is constipated.  Medications: See medication list taking as prescribed.  Hernia Sx 2023   Patient denies fever, diarrhea, fatigue, chills, nausea, vomiting, dyspnea, unintentional weight loss.  Colonoscopy-  (Dr. Yovanny Geller) Upper Endoscopy-  (Dr. Yovanny Geller) Mammography- 2023 (Dr. Hurt)  GYN Pelvic Exam/pap- 3/2023 (Dr. Hurt)  A0   Last Labs on 2/15/2024 WBC 6.18, Hgb 11.1, Hct 33, , MCV 90.9,  Ferritin 87, Iron 45, % Iron sat 15 (low), TIBC 307 B12 872, Folate >20 (high) TSH  1.92, T4  1.45 BUN 26 (high), BUN/CREAT RATIO 34.2 (high)

## 2024-05-08 ENCOUNTER — APPOINTMENT (OUTPATIENT)
Dept: CARDIOLOGY | Facility: CLINIC | Age: 74
End: 2024-05-08
Payer: MEDICARE

## 2024-05-08 VITALS
RESPIRATION RATE: 16 BRPM | BODY MASS INDEX: 23.41 KG/M2 | SYSTOLIC BLOOD PRESSURE: 130 MMHG | HEIGHT: 61 IN | WEIGHT: 124 LBS | DIASTOLIC BLOOD PRESSURE: 68 MMHG | HEART RATE: 78 BPM | OXYGEN SATURATION: 98 %

## 2024-05-08 DIAGNOSIS — E78.00 PURE HYPERCHOLESTEROLEMIA, UNSPECIFIED: ICD-10-CM

## 2024-05-08 DIAGNOSIS — I48.0 PAROXYSMAL ATRIAL FIBRILLATION: ICD-10-CM

## 2024-05-08 DIAGNOSIS — I10 ESSENTIAL (PRIMARY) HYPERTENSION: ICD-10-CM

## 2024-05-08 PROCEDURE — G2211 COMPLEX E/M VISIT ADD ON: CPT

## 2024-05-08 PROCEDURE — 99214 OFFICE O/P EST MOD 30 MIN: CPT

## 2024-05-10 LAB
ANA SER IF-ACNC: NEGATIVE
HCT VFR BLD CALC: 36.5 %
HGB BLD-MCNC: 12.1 G/DL
M PROTEIN SPEC IFE-MCNC: NORMAL
MCHC RBC-ENTMCNC: 30 PG
MCHC RBC-ENTMCNC: 33.2 G/DL
MCV RBC AUTO: 90.6 FL
METHYLMALONATE SERPL-SCNC: 202 NMOL/L
PLATELET # BLD AUTO: 280 K/UL
PMV BLD: 12.2 FL
RBC # BLD: 4.03 M/UL
RBC # FLD: 13.8 %
RHEUMATOID FACT SER QL: <10 IU/ML
WBC # FLD AUTO: 6.96 K/UL

## 2024-05-10 NOTE — DISCUSSION/SUMMARY
[FreeTextEntry1] : HTN: The impression is hypertension. Currently, the condition is controlled. There are no changes in medication management. Continue current medical therapy. Other planned treatments include an exercise regimen, weight loss, low sodium diet, and alcohol moderation.  HLD: The impression is hyperlipidemia. Currently, the condition is stable. There are no changes in medication management. Continue current medical therapy. Other planned treatment includes diet modification, exercise, and weight loss.  PAF: The impression is paroxysmal atrial fibrillation. Currently, the condition is stable. There are no changes in medication management. Continue current medical therapy.  Instructed to follow up in 1 year.  Plan was discussed with the patient.

## 2024-05-10 NOTE — HISTORY OF PRESENT ILLNESS
[FreeTextEntry1] : JODY EMERSON is a 74-year-old female, with a PMHx significant for HTN, HLD, arthritis, scoliosis, and PAF, who presents today for a follow-up visit. Patient has some venous insufficiency. Denies any other complaints at this time. Otherwise: (-) chest pain, (-) SOB.

## 2024-05-28 ENCOUNTER — RX RENEWAL (OUTPATIENT)
Age: 74
End: 2024-05-28

## 2024-05-28 RX ORDER — METOPROLOL SUCCINATE 50 MG/1
50 TABLET, EXTENDED RELEASE ORAL DAILY
Qty: 90 | Refills: 3 | Status: ACTIVE | COMMUNITY
Start: 1900-01-01 | End: 1900-01-01

## 2024-06-24 RX ORDER — APIXABAN 5 MG/1
5 TABLET, FILM COATED ORAL
Qty: 90 | Refills: 3 | Status: ACTIVE | COMMUNITY
Start: 2022-12-12 | End: 1900-01-01

## 2024-06-25 ENCOUNTER — APPOINTMENT (OUTPATIENT)
Age: 74
End: 2024-06-25
Payer: MEDICARE

## 2024-06-25 ENCOUNTER — OUTPATIENT (OUTPATIENT)
Dept: OUTPATIENT SERVICES | Facility: HOSPITAL | Age: 74
LOS: 1 days | End: 2024-06-25
Payer: MEDICARE

## 2024-06-25 VITALS
RESPIRATION RATE: 16 BRPM | HEART RATE: 72 BPM | DIASTOLIC BLOOD PRESSURE: 60 MMHG | OXYGEN SATURATION: 99 % | TEMPERATURE: 98.4 F | BODY MASS INDEX: 23.24 KG/M2 | SYSTOLIC BLOOD PRESSURE: 148 MMHG | WEIGHT: 123.1 LBS | HEIGHT: 61 IN

## 2024-06-25 DIAGNOSIS — D64.9 ANEMIA, UNSPECIFIED: ICD-10-CM

## 2024-06-25 DIAGNOSIS — Z98.890 OTHER SPECIFIED POSTPROCEDURAL STATES: Chronic | ICD-10-CM

## 2024-06-25 PROCEDURE — 99204 OFFICE O/P NEW MOD 45 MIN: CPT

## 2024-06-25 PROCEDURE — 99214 OFFICE O/P EST MOD 30 MIN: CPT

## 2024-06-26 DIAGNOSIS — D64.9 ANEMIA, UNSPECIFIED: ICD-10-CM

## 2024-10-04 NOTE — ASU PATIENT PROFILE, ADULT - NSTRANFUSIONPLAN_GEN_ALL_CORE_SI[facility]
Sandy LINK.      Alissa, caregiver from group home, present to observe mobility.  Alissa Attending to bill not applicable

## 2024-11-04 ENCOUNTER — APPOINTMENT (OUTPATIENT)
Dept: ELECTROPHYSIOLOGY | Facility: CLINIC | Age: 74
End: 2024-11-04
Payer: MEDICARE

## 2024-11-04 VITALS
WEIGHT: 125 LBS | DIASTOLIC BLOOD PRESSURE: 67 MMHG | HEART RATE: 67 BPM | HEIGHT: 61 IN | SYSTOLIC BLOOD PRESSURE: 127 MMHG | TEMPERATURE: 97.1 F | BODY MASS INDEX: 23.6 KG/M2

## 2024-11-04 DIAGNOSIS — I48.0 PAROXYSMAL ATRIAL FIBRILLATION: ICD-10-CM

## 2024-11-04 DIAGNOSIS — D64.9 ANEMIA, UNSPECIFIED: ICD-10-CM

## 2024-11-04 DIAGNOSIS — I10 ESSENTIAL (PRIMARY) HYPERTENSION: ICD-10-CM

## 2024-11-04 PROCEDURE — 99214 OFFICE O/P EST MOD 30 MIN: CPT

## 2024-11-04 PROCEDURE — 93000 ELECTROCARDIOGRAM COMPLETE: CPT

## 2024-11-04 RX ORDER — ASCORBIC ACID 500 MG
500 TABLET ORAL DAILY
Refills: 0 | Status: ACTIVE | COMMUNITY

## 2024-11-04 RX ORDER — SULFAMETHOXAZOLE AND TRIMETHOPRIM 800; 160 MG/1; MG/1
TABLET ORAL TWICE DAILY
Refills: 0 | Status: ACTIVE | COMMUNITY

## 2024-11-06 ENCOUNTER — APPOINTMENT (OUTPATIENT)
Dept: PULMONOLOGY | Facility: CLINIC | Age: 74
End: 2024-11-06
Payer: MEDICARE

## 2024-11-06 VITALS
HEART RATE: 75 BPM | OXYGEN SATURATION: 95 % | BODY MASS INDEX: 23.98 KG/M2 | SYSTOLIC BLOOD PRESSURE: 120 MMHG | RESPIRATION RATE: 14 BRPM | WEIGHT: 127 LBS | HEIGHT: 61 IN | DIASTOLIC BLOOD PRESSURE: 64 MMHG

## 2024-11-06 DIAGNOSIS — G47.33 OBSTRUCTIVE SLEEP APNEA (ADULT) (PEDIATRIC): ICD-10-CM

## 2024-11-06 PROCEDURE — G2211 COMPLEX E/M VISIT ADD ON: CPT

## 2024-11-06 PROCEDURE — 99213 OFFICE O/P EST LOW 20 MIN: CPT

## 2024-11-20 ENCOUNTER — TRANSCRIPTION ENCOUNTER (OUTPATIENT)
Age: 74
End: 2024-11-20

## 2025-01-03 ENCOUNTER — APPOINTMENT (OUTPATIENT)
Age: 75
End: 2025-01-03
Payer: MEDICARE

## 2025-01-03 ENCOUNTER — LABORATORY RESULT (OUTPATIENT)
Age: 75
End: 2025-01-03

## 2025-01-03 ENCOUNTER — OUTPATIENT (OUTPATIENT)
Dept: OUTPATIENT SERVICES | Facility: HOSPITAL | Age: 75
LOS: 1 days | End: 2025-01-03
Payer: MEDICARE

## 2025-01-03 VITALS
SYSTOLIC BLOOD PRESSURE: 133 MMHG | WEIGHT: 126 LBS | DIASTOLIC BLOOD PRESSURE: 79 MMHG | HEIGHT: 61 IN | BODY MASS INDEX: 23.79 KG/M2 | OXYGEN SATURATION: 96 % | TEMPERATURE: 98.1 F | HEART RATE: 79 BPM | RESPIRATION RATE: 16 BRPM

## 2025-01-03 DIAGNOSIS — D64.9 ANEMIA, UNSPECIFIED: ICD-10-CM

## 2025-01-03 DIAGNOSIS — Z98.890 OTHER SPECIFIED POSTPROCEDURAL STATES: Chronic | ICD-10-CM

## 2025-01-03 LAB
ALBUMIN SERPL ELPH-MCNC: 4.5 G/DL
ALP BLD-CCNC: 92 U/L
ALT SERPL-CCNC: 24 U/L
ANION GAP SERPL CALC-SCNC: 12 MMOL/L
AST SERPL-CCNC: 29 U/L
BILIRUB SERPL-MCNC: 0.4 MG/DL
BUN SERPL-MCNC: 34 MG/DL
CALCIUM SERPL-MCNC: 10.1 MG/DL
CHLORIDE SERPL-SCNC: 99 MMOL/L
CO2 SERPL-SCNC: 30 MMOL/L
CREAT SERPL-MCNC: 0.9 MG/DL
EGFR: 67 ML/MIN/1.73M2
GLUCOSE SERPL-MCNC: 103 MG/DL
HCT VFR BLD CALC: 31.7 %
HGB BLD-MCNC: 10.6 G/DL
LDH SERPL-CCNC: 255
MCHC RBC-ENTMCNC: 30.6 PG
MCHC RBC-ENTMCNC: 33.4 G/DL
MCV RBC AUTO: 91.6 FL
PLATELET # BLD AUTO: 232 K/UL
PMV BLD: 12.3 FL
POTASSIUM SERPL-SCNC: 3.7 MMOL/L
PROT SERPL-MCNC: 7.2 G/DL
RBC # BLD: 3.46 M/UL
RBC # FLD: 14.6 %
SODIUM SERPL-SCNC: 141 MMOL/L
WBC # FLD AUTO: 7 K/UL

## 2025-01-03 PROCEDURE — 86334 IMMUNOFIX E-PHORESIS SERUM: CPT

## 2025-01-03 PROCEDURE — 82728 ASSAY OF FERRITIN: CPT

## 2025-01-03 PROCEDURE — 82784 ASSAY IGA/IGD/IGG/IGM EACH: CPT

## 2025-01-03 PROCEDURE — 80053 COMPREHEN METABOLIC PANEL: CPT

## 2025-01-03 PROCEDURE — 84166 PROTEIN E-PHORESIS/URINE/CSF: CPT

## 2025-01-03 PROCEDURE — 84165 PROTEIN E-PHORESIS SERUM: CPT

## 2025-01-03 PROCEDURE — 83615 LACTATE (LD) (LDH) ENZYME: CPT

## 2025-01-03 PROCEDURE — 84155 ASSAY OF PROTEIN SERUM: CPT

## 2025-01-03 PROCEDURE — 99214 OFFICE O/P EST MOD 30 MIN: CPT

## 2025-01-03 PROCEDURE — 86335 IMMUNFIX E-PHORSIS/URINE/CSF: CPT

## 2025-01-03 PROCEDURE — 85027 COMPLETE CBC AUTOMATED: CPT

## 2025-01-03 PROCEDURE — 83521 IG LIGHT CHAINS FREE EACH: CPT

## 2025-01-03 PROCEDURE — 84156 ASSAY OF PROTEIN URINE: CPT

## 2025-01-04 DIAGNOSIS — D64.9 ANEMIA, UNSPECIFIED: ICD-10-CM

## 2025-01-04 LAB — FERRITIN SERPL-MCNC: 71 NG/ML

## 2025-01-06 LAB
ALBUPE: 18.8 %
ALPHA1UPE: 32.1 %
ALPHA2UPE: 23.3 %
BETAUPE: 12.1 %
GAMMAUPE: 13.7 %
IGA 24H UR QL IFE: NORMAL
KAPPA LC 24H UR QL: NORMAL
PROT PATTERN 24H UR ELPH-IMP: NORMAL
PROT UR-MCNC: 5 MG/DL
PROT UR-MCNC: 5 MG/DL

## 2025-01-11 LAB
ALBUMIN MFR SERPL ELPH: 57.5 %
ALBUMIN SERPL-MCNC: 4.3 G/DL
ALBUMIN/GLOB SERPL: 1.3 RATIO
ALPHA1 GLOB MFR SERPL ELPH: 4.8 %
ALPHA1 GLOB SERPL ELPH-MCNC: 0.4 G/DL
ALPHA2 GLOB MFR SERPL ELPH: 12.5 %
ALPHA2 GLOB SERPL ELPH-MCNC: 0.9 G/DL
B-GLOBULIN MFR SERPL ELPH: 10.1 %
B-GLOBULIN SERPL ELPH-MCNC: 0.8 G/DL
DEPRECATED KAPPA LC FREE/LAMBDA SER: 4.13 RATIO
GAMMA GLOB FLD ELPH-MCNC: 1.1 G/DL
GAMMA GLOB MFR SERPL ELPH: 15.1 %
IGA SER QL IEP: 71 MG/DL
IGG SER QL IEP: 783 MG/DL
IGM SER QL IEP: 649 MG/DL
INTERPRETATION SERPL IEP-IMP: NORMAL
KAPPA LC CSF-MCNC: 0.98 MG/DL
KAPPA LC SERPL-MCNC: 4.05 MG/DL
M PROTEIN MFR SERPL ELPH: NORMAL
M PROTEIN SPEC IFE-MCNC: NORMAL
MONOCLON BAND OBS SERPL: NORMAL
PROT SERPL-MCNC: 7.5 G/DL
PROT SERPL-MCNC: 7.5 G/DL

## 2025-04-03 ENCOUNTER — OUTPATIENT (OUTPATIENT)
Dept: OUTPATIENT SERVICES | Facility: HOSPITAL | Age: 75
LOS: 1 days | End: 2025-04-03
Payer: MEDICARE

## 2025-04-03 ENCOUNTER — LABORATORY RESULT (OUTPATIENT)
Age: 75
End: 2025-04-03

## 2025-04-03 ENCOUNTER — APPOINTMENT (OUTPATIENT)
Age: 75
End: 2025-04-03
Payer: MEDICARE

## 2025-04-03 VITALS
WEIGHT: 127.56 LBS | SYSTOLIC BLOOD PRESSURE: 160 MMHG | HEART RATE: 68 BPM | TEMPERATURE: 98.2 F | OXYGEN SATURATION: 96 % | HEIGHT: 61 IN | DIASTOLIC BLOOD PRESSURE: 73 MMHG | BODY MASS INDEX: 24.08 KG/M2

## 2025-04-03 VITALS — DIASTOLIC BLOOD PRESSURE: 69 MMHG | SYSTOLIC BLOOD PRESSURE: 151 MMHG

## 2025-04-03 DIAGNOSIS — D64.9 ANEMIA, UNSPECIFIED: ICD-10-CM

## 2025-04-03 DIAGNOSIS — Z98.890 OTHER SPECIFIED POSTPROCEDURAL STATES: Chronic | ICD-10-CM

## 2025-04-03 PROCEDURE — 82728 ASSAY OF FERRITIN: CPT

## 2025-04-03 PROCEDURE — 85025 COMPLETE CBC W/AUTO DIFF WBC: CPT

## 2025-04-03 PROCEDURE — 84155 ASSAY OF PROTEIN SERUM: CPT

## 2025-04-03 PROCEDURE — 84165 PROTEIN E-PHORESIS SERUM: CPT

## 2025-04-03 PROCEDURE — 99213 OFFICE O/P EST LOW 20 MIN: CPT

## 2025-04-03 PROCEDURE — 80053 COMPREHEN METABOLIC PANEL: CPT

## 2025-04-03 PROCEDURE — 86334 IMMUNOFIX E-PHORESIS SERUM: CPT

## 2025-04-04 ENCOUNTER — APPOINTMENT (OUTPATIENT)
Dept: OBGYN | Facility: CLINIC | Age: 75
End: 2025-04-04

## 2025-04-04 DIAGNOSIS — D64.9 ANEMIA, UNSPECIFIED: ICD-10-CM

## 2025-04-04 LAB
ALBUMIN SERPL ELPH-MCNC: 4.5 G/DL
ALP BLD-CCNC: 99 U/L
ALT SERPL-CCNC: 28 U/L
ANION GAP SERPL CALC-SCNC: 12 MMOL/L
AST SERPL-CCNC: 34 U/L
AUTO BASOPHILS #: 0.03 K/UL
AUTO BASOPHILS %: 0.4 %
AUTO EOSINOPHILS #: 0.06 K/UL
AUTO EOSINOPHILS %: 0.8 %
AUTO IMMATURE GRANULOCYTES #: 0.03 K/UL
AUTO LYMPHOCYTES #: 1.16 K/UL
AUTO LYMPHOCYTES %: 15.1 %
AUTO MONOCYTES #: 0.67 K/UL
AUTO MONOCYTES %: 8.7 %
AUTO NEUTROPHILS #: 5.71 K/UL
AUTO NEUTROPHILS %: 74.6 %
AUTO NRBC #: 0 K/UL
BILIRUB SERPL-MCNC: 0.5 MG/DL
BUN SERPL-MCNC: 38 MG/DL
CALCIUM SERPL-MCNC: 9.8 MG/DL
CHLORIDE SERPL-SCNC: 96 MMOL/L
CO2 SERPL-SCNC: 30 MMOL/L
CREAT SERPL-MCNC: 1 MG/DL
EGFRCR SERPLBLD CKD-EPI 2021: 59 ML/MIN/1.73M2
FERRITIN SERPL-MCNC: 57 NG/ML
GLUCOSE SERPL-MCNC: 106 MG/DL
HCT VFR BLD CALC: 31.6 %
HGB BLD-MCNC: 10.8 G/DL
IMM GRANULOCYTES NFR BLD AUTO: 0.4 %
MAN DIFF?: NORMAL
MCHC RBC-ENTMCNC: 31.1 PG
MCHC RBC-ENTMCNC: 34.2 G/DL
MCV RBC AUTO: 91.1 FL
PLATELET # BLD AUTO: 220 K/UL
PMV BLD AUTO: 0 /100 WBCS
PMV BLD: 12.4 FL
POTASSIUM SERPL-SCNC: 3.6 MMOL/L
PROT SERPL-MCNC: 7.5 G/DL
RBC # BLD: 3.47 M/UL
RBC # FLD: 13.6 %
SODIUM SERPL-SCNC: 138 MMOL/L
WBC # FLD AUTO: 7.66 K/UL

## 2025-04-08 LAB
ALBUMIN MFR SERPL ELPH: 56.2 %
ALBUMIN SERPL-MCNC: 4 G/DL
ALBUMIN/GLOB SERPL: 1.2 RATIO
ALPHA1 GLOB MFR SERPL ELPH: 5.3 %
ALPHA1 GLOB SERPL ELPH-MCNC: 0.4 G/DL
ALPHA2 GLOB MFR SERPL ELPH: 13 %
ALPHA2 GLOB SERPL ELPH-MCNC: 0.9 G/DL
B-GLOBULIN MFR SERPL ELPH: 10.4 %
B-GLOBULIN SERPL ELPH-MCNC: 0.7 G/DL
GAMMA GLOB FLD ELPH-MCNC: 1.1 G/DL
GAMMA GLOB MFR SERPL ELPH: 15.1 %
INTERPRETATION SERPL IEP-IMP: NORMAL
M PROTEIN MFR SERPL ELPH: NORMAL
MONOCLON BAND OBS SERPL: NORMAL
PROT SERPL-MCNC: 7.2 G/DL
PROT SERPL-MCNC: 7.2 G/DL

## 2025-05-06 ENCOUNTER — NON-APPOINTMENT (OUTPATIENT)
Age: 75
End: 2025-05-06

## 2025-05-08 ENCOUNTER — APPOINTMENT (OUTPATIENT)
Dept: CARDIOLOGY | Facility: CLINIC | Age: 75
End: 2025-05-08
Payer: MEDICARE

## 2025-05-08 VITALS
RESPIRATION RATE: 16 BRPM | HEART RATE: 72 BPM | DIASTOLIC BLOOD PRESSURE: 68 MMHG | OXYGEN SATURATION: 98 % | WEIGHT: 124 LBS | BODY MASS INDEX: 23.41 KG/M2 | HEIGHT: 61 IN | SYSTOLIC BLOOD PRESSURE: 120 MMHG

## 2025-05-08 DIAGNOSIS — I48.0 PAROXYSMAL ATRIAL FIBRILLATION: ICD-10-CM

## 2025-05-08 DIAGNOSIS — I10 ESSENTIAL (PRIMARY) HYPERTENSION: ICD-10-CM

## 2025-05-08 DIAGNOSIS — E78.00 PURE HYPERCHOLESTEROLEMIA, UNSPECIFIED: ICD-10-CM

## 2025-05-08 PROCEDURE — 99214 OFFICE O/P EST MOD 30 MIN: CPT

## 2025-05-08 PROCEDURE — G2211 COMPLEX E/M VISIT ADD ON: CPT

## 2025-05-13 ENCOUNTER — NON-APPOINTMENT (OUTPATIENT)
Age: 75
End: 2025-05-13

## 2025-05-13 LAB
ANION GAP SERPL CALC-SCNC: 14 MMOL/L
BASOPHILS # BLD AUTO: 0.03 K/UL
BASOPHILS NFR BLD AUTO: 0.4 %
BUN SERPL-MCNC: 33 MG/DL
CALCIUM SERPL-MCNC: 10.1 MG/DL
CHLORIDE SERPL-SCNC: 100 MMOL/L
CHOLEST SERPL-MCNC: 177 MG/DL
CO2 SERPL-SCNC: 29 MMOL/L
CREAT SERPL-MCNC: 1.1 MG/DL
EGFRCR SERPLBLD CKD-EPI 2021: 52 ML/MIN/1.73M2
EOSINOPHIL # BLD AUTO: 0.18 K/UL
EOSINOPHIL NFR BLD AUTO: 2.5 %
GLUCOSE SERPL-MCNC: 101 MG/DL
HCT VFR BLD CALC: 34.3 %
HDLC SERPL-MCNC: 75 MG/DL
HGB BLD-MCNC: 11.2 G/DL
IMM GRANULOCYTES NFR BLD AUTO: 0.4 %
LDLC SERPL-MCNC: 85 MG/DL
LYMPHOCYTES # BLD AUTO: 1.43 K/UL
LYMPHOCYTES NFR BLD AUTO: 19.8 %
MAN DIFF?: NORMAL
MCHC RBC-ENTMCNC: 30.7 PG
MCHC RBC-ENTMCNC: 32.7 G/DL
MCV RBC AUTO: 94 FL
MONOCYTES # BLD AUTO: 0.58 K/UL
MONOCYTES NFR BLD AUTO: 8 %
NEUTROPHILS # BLD AUTO: 4.97 K/UL
NEUTROPHILS NFR BLD AUTO: 68.9 %
NONHDLC SERPL-MCNC: 102 MG/DL
PLATELET # BLD AUTO: 211 K/UL
PMV BLD AUTO: 0 /100 WBCS
PMV BLD: 13.7 FL
POTASSIUM SERPL-SCNC: 4.2 MMOL/L
RBC # BLD: 3.65 M/UL
RBC # FLD: 14.2 %
SODIUM SERPL-SCNC: 143 MMOL/L
TRIGL SERPL-MCNC: 96 MG/DL
WBC # FLD AUTO: 7.22 K/UL

## 2025-07-10 ENCOUNTER — APPOINTMENT (OUTPATIENT)
Dept: OBGYN | Facility: CLINIC | Age: 75
End: 2025-07-10
Payer: MEDICARE

## 2025-07-10 ENCOUNTER — NON-APPOINTMENT (OUTPATIENT)
Age: 75
End: 2025-07-10

## 2025-07-10 VITALS
DIASTOLIC BLOOD PRESSURE: 69 MMHG | BODY MASS INDEX: 23.41 KG/M2 | HEIGHT: 61 IN | SYSTOLIC BLOOD PRESSURE: 140 MMHG | TEMPERATURE: 98.6 F | HEART RATE: 78 BPM | WEIGHT: 124 LBS

## 2025-07-10 PROCEDURE — G0101: CPT | Mod: GA

## 2025-07-14 LAB — HPV HIGH+LOW RISK DNA PNL CVX: NOT DETECTED

## 2025-08-20 DIAGNOSIS — R92.30 DENSE BREASTS, UNSPECIFIED: ICD-10-CM

## 2025-09-19 ENCOUNTER — NON-APPOINTMENT (OUTPATIENT)
Age: 75
End: 2025-09-19